# Patient Record
Sex: FEMALE | Race: WHITE | NOT HISPANIC OR LATINO
[De-identification: names, ages, dates, MRNs, and addresses within clinical notes are randomized per-mention and may not be internally consistent; named-entity substitution may affect disease eponyms.]

---

## 2017-02-04 ENCOUNTER — RX RENEWAL (OUTPATIENT)
Age: 37
End: 2017-02-04

## 2017-06-26 ENCOUNTER — MEDICATION RENEWAL (OUTPATIENT)
Age: 37
End: 2017-06-26

## 2017-07-07 ENCOUNTER — NON-APPOINTMENT (OUTPATIENT)
Age: 37
End: 2017-07-07

## 2017-07-07 ENCOUNTER — APPOINTMENT (OUTPATIENT)
Dept: INTERNAL MEDICINE | Facility: CLINIC | Age: 37
End: 2017-07-07

## 2017-07-07 VITALS
TEMPERATURE: 97.8 F | OXYGEN SATURATION: 99 % | BODY MASS INDEX: 31.02 KG/M2 | DIASTOLIC BLOOD PRESSURE: 60 MMHG | WEIGHT: 193 LBS | HEART RATE: 60 BPM | SYSTOLIC BLOOD PRESSURE: 90 MMHG | HEIGHT: 66 IN

## 2017-07-07 DIAGNOSIS — Z87.42 PERSONAL HISTORY OF OTHER DISEASES OF THE FEMALE GENITAL TRACT: ICD-10-CM

## 2017-07-07 LAB
25(OH)D3 SERPL-MCNC: 35.1 NG/ML
ALBUMIN SERPL ELPH-MCNC: 4.1 G/DL
ALP BLD-CCNC: 59 U/L
ALT SERPL-CCNC: 23 U/L
ANION GAP SERPL CALC-SCNC: 15 MMOL/L
AST SERPL-CCNC: 18 U/L
BASOPHILS # BLD AUTO: 0.02 K/UL
BASOPHILS NFR BLD AUTO: 0.3 %
BILIRUB SERPL-MCNC: 0.2 MG/DL
BILIRUB UR QL STRIP: NORMAL
BUN SERPL-MCNC: 20 MG/DL
CALCIUM SERPL-MCNC: 9.7 MG/DL
CHLORIDE SERPL-SCNC: 101 MMOL/L
CHOLEST SERPL-MCNC: 215 MG/DL
CHOLEST/HDLC SERPL: 3.5 RATIO
CO2 SERPL-SCNC: 24 MMOL/L
CREAT SERPL-MCNC: 0.92 MG/DL
EOSINOPHIL # BLD AUTO: 0.19 K/UL
EOSINOPHIL NFR BLD AUTO: 2.7 %
GLUCOSE SERPL-MCNC: 97 MG/DL
GLUCOSE UR-MCNC: NORMAL
HBA1C MFR BLD HPLC: 5.2 %
HCG UR QL: 0.2 EU/DL
HCT VFR BLD CALC: 43.8 %
HDLC SERPL-MCNC: 61 MG/DL
HGB BLD-MCNC: 14.3 G/DL
HGB UR QL STRIP.AUTO: NORMAL
IMM GRANULOCYTES NFR BLD AUTO: 0.7 %
KETONES UR-MCNC: NORMAL
LDLC SERPL CALC-MCNC: 136 MG/DL
LEUKOCYTE ESTERASE UR QL STRIP: NORMAL
LYMPHOCYTES # BLD AUTO: 2.15 K/UL
LYMPHOCYTES NFR BLD AUTO: 31.1 %
MAN DIFF?: NORMAL
MCHC RBC-ENTMCNC: 31.6 PG
MCHC RBC-ENTMCNC: 32.6 GM/DL
MCV RBC AUTO: 96.7 FL
MONOCYTES # BLD AUTO: 0.52 K/UL
MONOCYTES NFR BLD AUTO: 7.5 %
NEUTROPHILS # BLD AUTO: 3.99 K/UL
NEUTROPHILS NFR BLD AUTO: 57.7 %
NITRITE UR QL STRIP: NORMAL
PH UR STRIP: 6.5
PLATELET # BLD AUTO: 215 K/UL
POTASSIUM SERPL-SCNC: 5.2 MMOL/L
PROT SERPL-MCNC: 7.5 G/DL
PROT UR STRIP-MCNC: NORMAL
RBC # BLD: 4.53 M/UL
RBC # FLD: 14.2 %
SODIUM SERPL-SCNC: 140 MMOL/L
SP GR UR STRIP: 1.02
TRIGL SERPL-MCNC: 92 MG/DL
TSH SERPL-ACNC: 1.5 UIU/ML
WBC # FLD AUTO: 6.92 K/UL

## 2017-07-07 RX ORDER — SODIUM PICOSULFATE, MAGNESIUM OXIDE, AND ANHYDROUS CITRIC ACID 10; 3.5; 12 MG/16.2G; G/16.2G; G/16.2G
10-3.5-12 POWDER, METERED ORAL
Qty: 2 | Refills: 0 | Status: COMPLETED | COMMUNITY
Start: 2017-05-05

## 2017-07-09 LAB — 5NT SERPL-CCNC: 4 U/L

## 2017-10-10 ENCOUNTER — MEDICATION RENEWAL (OUTPATIENT)
Age: 37
End: 2017-10-10

## 2017-10-11 ENCOUNTER — RX RENEWAL (OUTPATIENT)
Age: 37
End: 2017-10-11

## 2017-12-06 ENCOUNTER — APPOINTMENT (OUTPATIENT)
Dept: INTERNAL MEDICINE | Facility: CLINIC | Age: 37
End: 2017-12-06
Payer: COMMERCIAL

## 2017-12-06 VITALS
DIASTOLIC BLOOD PRESSURE: 62 MMHG | BODY MASS INDEX: 30.86 KG/M2 | HEIGHT: 66 IN | HEART RATE: 80 BPM | TEMPERATURE: 97.7 F | WEIGHT: 192 LBS | SYSTOLIC BLOOD PRESSURE: 102 MMHG | RESPIRATION RATE: 16 BRPM | OXYGEN SATURATION: 98 %

## 2017-12-06 LAB
FLUAV SPEC QL CULT: NORMAL
FLUBV AG SPEC QL IA: NORMAL

## 2017-12-06 PROCEDURE — 87804 INFLUENZA ASSAY W/OPTIC: CPT | Mod: 59,QW

## 2017-12-06 PROCEDURE — 99213 OFFICE O/P EST LOW 20 MIN: CPT

## 2018-02-12 ENCOUNTER — APPOINTMENT (OUTPATIENT)
Dept: INTERNAL MEDICINE | Facility: CLINIC | Age: 38
End: 2018-02-12
Payer: COMMERCIAL

## 2018-02-12 VITALS
SYSTOLIC BLOOD PRESSURE: 100 MMHG | BODY MASS INDEX: 30.92 KG/M2 | HEART RATE: 75 BPM | TEMPERATURE: 98.2 F | DIASTOLIC BLOOD PRESSURE: 60 MMHG | OXYGEN SATURATION: 96 % | HEIGHT: 67 IN | WEIGHT: 197 LBS

## 2018-02-12 LAB
FLUAV SPEC QL CULT: NEGATIVE
FLUBV AG SPEC QL IA: NEGATIVE

## 2018-02-12 PROCEDURE — 99213 OFFICE O/P EST LOW 20 MIN: CPT

## 2018-02-12 PROCEDURE — 87804 INFLUENZA ASSAY W/OPTIC: CPT | Mod: 59,QW

## 2018-02-12 RX ORDER — OSELTAMIVIR PHOSPHATE 75 MG/1
75 CAPSULE ORAL TWICE DAILY
Qty: 10 | Refills: 0 | Status: DISCONTINUED | COMMUNITY
Start: 2017-12-06 | End: 2018-02-12

## 2018-02-13 LAB — RAPID RVP RESULT: NOT DETECTED

## 2018-04-24 ENCOUNTER — RX RENEWAL (OUTPATIENT)
Age: 38
End: 2018-04-24

## 2018-06-11 ENCOUNTER — APPOINTMENT (OUTPATIENT)
Dept: INTERNAL MEDICINE | Facility: CLINIC | Age: 38
End: 2018-06-11
Payer: COMMERCIAL

## 2018-06-11 VITALS
HEART RATE: 73 BPM | BODY MASS INDEX: 30.92 KG/M2 | DIASTOLIC BLOOD PRESSURE: 76 MMHG | TEMPERATURE: 97.7 F | RESPIRATION RATE: 16 BRPM | OXYGEN SATURATION: 99 % | WEIGHT: 197 LBS | SYSTOLIC BLOOD PRESSURE: 114 MMHG | HEIGHT: 67 IN

## 2018-06-11 PROCEDURE — 99213 OFFICE O/P EST LOW 20 MIN: CPT

## 2018-06-11 NOTE — HISTORY OF PRESENT ILLNESS
[FreeTextEntry8] : Pt here for a sick visit.\par \par She woke up this morning feeling very fatigued and lightheaded. She sensed onset of fatigue yesterday, was napping most of day. Felt sense of fogginess while driving here today. She felt dizzy while getting out of bed this morning especially with head movements. She may have had vertigo. No hearing loss or tinnitus. No fever or chills. She developed a dry cough yesterday but dissipated today. Her voice is hoarse. She feels weak and fatigued. No nasal congestion, sneezing, sore throat, or sinus pressure. She felt nauseous earlier but no vomiting. She had a headache yesterday and took Advil. 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 No recent travel.

## 2018-06-11 NOTE — REVIEW OF SYSTEMS
[Fever] : no fever [Chills] : no chills [Fatigue] : fatigue [Earache] : no earache [Hoarseness] : hoarseness [Nasal Discharge] : no nasal discharge [Sore Throat] : no sore throat [Chest Pain] : no chest pain [Lower Ext Edema] : no lower extremity edema [Shortness Of Breath] : no shortness of breath [Cough] : no cough [Abdominal Pain] : no abdominal pain [Nausea] : nausea [Diarrhea] : diarrhea [Vomiting] : no vomiting [Dysuria] : no dysuria [Frequency] : no frequency

## 2018-06-11 NOTE — ASSESSMENT
[FreeTextEntry1] : Patient appears to have BPPV with associated mild URI that is likely viral. She is afebrile and without myalgias so influenza is unlikely. She will be started on Meclizine PRN for dizziness. She was advised on adequate hydration. NSAID's PRN. She will go home and rest today. Will consider ENT evaluation and management if the dizziness persists during the week.

## 2018-06-11 NOTE — PHYSICAL EXAM
[No Acute Distress] : no acute distress [Well Nourished] : well nourished [Well Developed] : well developed [PERRL] : pupils equal round and reactive to light [EOMI] : extraocular movements intact [Normal Outer Ear/Nose] : the outer ears and nose were normal in appearance [Normal Oropharynx] : the oropharynx was normal [Normal TMs] : both tympanic membranes were normal [Supple] : supple [No Lymphadenopathy] : no lymphadenopathy [No Respiratory Distress] : no respiratory distress  [Clear to Auscultation] : lungs were clear to auscultation bilaterally [Normal Rate] : normal rate  [Regular Rhythm] : with a regular rhythm [Normal S1, S2] : normal S1 and S2 [No Murmur] : no murmur heard [No Edema] : there was no peripheral edema [Soft] : abdomen soft [Non Tender] : non-tender [Non-distended] : non-distended [Normal Gait] : normal gait [No Focal Deficits] : no focal deficits [de-identified] : no nystagmus [de-identified] : no sinus tenderness [de-identified] : rox cohn positive on right

## 2018-07-17 ENCOUNTER — LABORATORY RESULT (OUTPATIENT)
Age: 38
End: 2018-07-17

## 2018-07-17 ENCOUNTER — APPOINTMENT (OUTPATIENT)
Dept: INTERNAL MEDICINE | Facility: CLINIC | Age: 38
End: 2018-07-17
Payer: COMMERCIAL

## 2018-07-17 PROCEDURE — 36415 COLL VENOUS BLD VENIPUNCTURE: CPT

## 2018-07-20 ENCOUNTER — NON-APPOINTMENT (OUTPATIENT)
Age: 38
End: 2018-07-20

## 2018-07-20 ENCOUNTER — APPOINTMENT (OUTPATIENT)
Dept: INTERNAL MEDICINE | Facility: CLINIC | Age: 38
End: 2018-07-20
Payer: COMMERCIAL

## 2018-07-20 VITALS
OXYGEN SATURATION: 99 % | SYSTOLIC BLOOD PRESSURE: 120 MMHG | BODY MASS INDEX: 31.13 KG/M2 | WEIGHT: 196 LBS | HEIGHT: 66.5 IN | DIASTOLIC BLOOD PRESSURE: 72 MMHG | TEMPERATURE: 97.9 F | HEART RATE: 64 BPM

## 2018-07-20 DIAGNOSIS — R68.89 OTHER GENERAL SYMPTOMS AND SIGNS: ICD-10-CM

## 2018-07-20 DIAGNOSIS — L82.1 OTHER SEBORRHEIC KERATOSIS: ICD-10-CM

## 2018-07-20 DIAGNOSIS — Z82.49 FAMILY HISTORY OF ISCHEMIC HEART DISEASE AND OTHER DISEASES OF THE CIRCULATORY SYSTEM: ICD-10-CM

## 2018-07-20 DIAGNOSIS — H81.10 BENIGN PAROXYSMAL VERTIGO, UNSPECIFIED EAR: ICD-10-CM

## 2018-07-20 DIAGNOSIS — J06.9 ACUTE UPPER RESPIRATORY INFECTION, UNSPECIFIED: ICD-10-CM

## 2018-07-20 LAB
25(OH)D3 SERPL-MCNC: 26.1 NG/ML
ALBUMIN SERPL ELPH-MCNC: 4.1 G/DL
ALP BLD-CCNC: 55 U/L
ALT SERPL-CCNC: 21 U/L
ANION GAP SERPL CALC-SCNC: 14 MMOL/L
AST SERPL-CCNC: 27 U/L
BASOPHILS # BLD AUTO: 0.02 K/UL
BASOPHILS NFR BLD AUTO: 0.4 %
BILIRUB SERPL-MCNC: 0.4 MG/DL
BUN SERPL-MCNC: 13 MG/DL
CALCIUM SERPL-MCNC: 9.4 MG/DL
CHLORIDE SERPL-SCNC: 104 MMOL/L
CHOLEST SERPL-MCNC: 205 MG/DL
CHOLEST/HDLC SERPL: 4 RATIO
CO2 SERPL-SCNC: 22 MMOL/L
CREAT SERPL-MCNC: 0.69 MG/DL
EOSINOPHIL # BLD AUTO: 0.12 K/UL
EOSINOPHIL NFR BLD AUTO: 2.4 %
GLUCOSE SERPL-MCNC: 102 MG/DL
HBA1C MFR BLD HPLC: 5.4 %
HCT VFR BLD CALC: 40.5 %
HDLC SERPL-MCNC: 51 MG/DL
HGB BLD-MCNC: 13.6 G/DL
IMM GRANULOCYTES NFR BLD AUTO: 0.6 %
LDLC SERPL CALC-MCNC: 137 MG/DL
LYMPHOCYTES # BLD AUTO: 1.35 K/UL
LYMPHOCYTES NFR BLD AUTO: 27.4 %
MAN DIFF?: NORMAL
MCHC RBC-ENTMCNC: 31.4 PG
MCHC RBC-ENTMCNC: 33.6 GM/DL
MCV RBC AUTO: 93.5 FL
MONOCYTES # BLD AUTO: 0.44 K/UL
MONOCYTES NFR BLD AUTO: 8.9 %
NEUTROPHILS # BLD AUTO: 2.97 K/UL
NEUTROPHILS NFR BLD AUTO: 60.3 %
PLATELET # BLD AUTO: 216 K/UL
POTASSIUM SERPL-SCNC: 4 MMOL/L
PROT SERPL-MCNC: 7.4 G/DL
RBC # BLD: 4.33 M/UL
RBC # FLD: 14.2 %
SODIUM SERPL-SCNC: 140 MMOL/L
T3RU NFR SERPL: 1.02 INDEX
T4 SERPL-MCNC: 8.1 UG/DL
TRIGL SERPL-MCNC: 85 MG/DL
TSH SERPL-ACNC: 0.98 UIU/ML
WBC # FLD AUTO: 4.93 K/UL

## 2018-07-20 PROCEDURE — 99406 BEHAV CHNG SMOKING 3-10 MIN: CPT

## 2018-07-20 PROCEDURE — 93000 ELECTROCARDIOGRAM COMPLETE: CPT

## 2018-07-20 PROCEDURE — 99395 PREV VISIT EST AGE 18-39: CPT | Mod: 25

## 2018-07-20 RX ORDER — MECLIZINE HYDROCHLORIDE 12.5 MG/1
12.5 TABLET ORAL 3 TIMES DAILY
Qty: 30 | Refills: 1 | Status: DISCONTINUED | COMMUNITY
Start: 2018-06-11 | End: 2018-07-20

## 2018-07-21 PROBLEM — L82.1 SEBORRHEIC KERATOSES: Status: ACTIVE | Noted: 2018-07-21

## 2018-07-21 PROBLEM — H81.10 BENIGN PAROXYSMAL POSITIONAL VERTIGO: Status: RESOLVED | Noted: 2018-06-11 | Resolved: 2018-07-21

## 2018-07-21 PROBLEM — J06.9 ACUTE URI: Status: RESOLVED | Noted: 2018-02-12 | Resolved: 2018-07-21

## 2018-07-21 PROBLEM — R68.89 FLU-LIKE SYMPTOMS: Status: RESOLVED | Noted: 2017-12-06 | Resolved: 2018-07-21

## 2018-07-21 LAB
APPEARANCE: CLEAR
BACTERIA: NEGATIVE
BILIRUBIN URINE: NEGATIVE
BLOOD URINE: ABNORMAL
COLOR: YELLOW
GLUCOSE QUALITATIVE U: NEGATIVE MG/DL
HYALINE CASTS: 0 /LPF
KETONES URINE: NEGATIVE
LEUKOCYTE ESTERASE URINE: NEGATIVE
MICROSCOPIC-UA: NORMAL
NITRITE URINE: NEGATIVE
PH URINE: 5.5
PROTEIN URINE: ABNORMAL MG/DL
RED BLOOD CELLS URINE: 12 /HPF
SPECIFIC GRAVITY URINE: 1.02
SQUAMOUS EPITHELIAL CELLS: 13 /HPF
UROBILINOGEN URINE: NEGATIVE MG/DL
WHITE BLOOD CELLS URINE: 2 /HPF

## 2018-07-21 NOTE — HEALTH RISK ASSESSMENT
[No falls in past year] : Patient reported no falls in the past year [0] : 2) Feeling down, depressed, or hopeless: Not at all (0) [Patient reported PAP Smear was normal] : Patient reported PAP Smear was normal [HIV Test offered] : HIV Test offered [None] : None [With Family] : lives with family [Single] : single [Sexually Active] : sexually active [Feels Safe at Home] : Feels safe at home [Fully functional (bathing, dressing, toileting, transferring, walking, feeding)] : Fully functional (bathing, dressing, toileting, transferring, walking, feeding) [Fully functional (using the telephone, shopping, preparing meals, housekeeping, doing laundry, using] : Fully functional and needs no help or supervision to perform IADLs (using the telephone, shopping, preparing meals, housekeeping, doing laundry, using transportation, managing medications and managing finances) [Smoke Detector] : smoke detector [Carbon Monoxide Detector] : carbon monoxide detector [Guns at Home] : guns at home [Seat Belt] :  uses seat belt [Sunscreen] : uses sunscreen [] : No [de-identified] : social [[id number]] : 0 [Change in mental status noted] : No change in mental status noted [Language] : denies difficulty with language [Behavior] : denies difficulty with behavior [Learning/Retaining New Information] : denies difficulty learning/retaining new information [Handling Complex Tasks] : denies difficulty handling complex tasks [Reasoning] : denies difficulty with reasoning [Spatial Ability and Orientation] : denies difficulty with spatial ability and orientation [High Risk Behavior] : no high risk behavior [Reports changes in hearing] : Reports no changes in hearing [Reports changes in vision] : Reports no changes in vision [Reports changes in dental health] : Reports no changes in dental health [Travel to Developing Areas] : does not  travel to developing areas [PapSmearDate] : 2018 [ColonoscopyDate] : 5/2017 [ColonoscopyComments] : no polyps [de-identified] : stored safely

## 2018-07-21 NOTE — REVIEW OF SYSTEMS
[Night Sweats] : night sweats [Palpitations] : palpitations [Suicidal] : suicidal [Anxiety] : anxiety [Depression] : depression [Negative] : Heme/Lymph

## 2018-07-21 NOTE — ASSESSMENT
[FreeTextEntry1] : The patient was given a referral to psychiatry for her depression and episodic suicidal ideation.  She states she would not actively hurt herself.  Emotional support given. \par She was given a referral to plastic surgery to consider breast reduction\par Her blood work was reviewed with her.  She will begin Vitamin D  400- 1000 units daily\par Smoking cessation reviewed..methods discussed.  Patient aware of deleterious effects of smoking which were reviewed with her.  She is not ready to consider quitting.  3 minutes spent in discussion\par She was reassured about benign nature of seborrheic keratoses.\par She is asymptomatic from her sinus bradycardia

## 2018-07-21 NOTE — HISTORY OF PRESENT ILLNESS
[FreeTextEntry1] : CPE [de-identified] : Patient is concerned about a skin lesion on her left lateral leg and right flank. She is concerned about one on her right flank as it is darker. She is also considering breast reduction as she is getting neck pain and she has ridges on her shoulders from the bra straps. She has pain with running and has to hold her breasts when running despite a sports bar. She runs regularly 4 times a week without chest pain or shortness of breath. She had vertigo in June which is resolved.   She has no hearing issues no tinnitus.  Her vision is fine. For the past month she can wake up with sweats.   She's had no fever. Her menses are regular.   She sees a gynecologist and she has her menses  now. She wells up in tears talking about some regrets and depression and some suicidal ideation  which occurs mainly before her menses. She feels life has passed her by  she is not  and doesn't have kids. She feels the years she devoted to caring for her father caused this but she does not regret that . She says that people  state "god" will pay her back but she hasn't been paid back. She eats a healthy diet.   She is living with her mother and brother  and they both smoke as does she. She smokes half a pack a day. She is not ready to consider smoking cessation\par Rarely she can feel a skipped heartbeat when she is just sitting.  She has no dizziness. \par Her bowels and urine are fine.\par Her panic attacks have been rare.\par She tries to eat a healthy diet.

## 2018-07-21 NOTE — HEALTH RISK ASSESSMENT
[No falls in past year] : Patient reported no falls in the past year [0] : 2) Feeling down, depressed, or hopeless: Not at all (0) [Patient reported PAP Smear was normal] : Patient reported PAP Smear was normal [HIV Test offered] : HIV Test offered [None] : None [With Family] : lives with family [Single] : single [Sexually Active] : sexually active [Feels Safe at Home] : Feels safe at home [Fully functional (bathing, dressing, toileting, transferring, walking, feeding)] : Fully functional (bathing, dressing, toileting, transferring, walking, feeding) [Fully functional (using the telephone, shopping, preparing meals, housekeeping, doing laundry, using] : Fully functional and needs no help or supervision to perform IADLs (using the telephone, shopping, preparing meals, housekeeping, doing laundry, using transportation, managing medications and managing finances) [Smoke Detector] : smoke detector [Carbon Monoxide Detector] : carbon monoxide detector [Guns at Home] : guns at home [Seat Belt] :  uses seat belt [Sunscreen] : uses sunscreen [] : No [de-identified] : social [[id number]] : 0 [Change in mental status noted] : No change in mental status noted [Language] : denies difficulty with language [Behavior] : denies difficulty with behavior [Learning/Retaining New Information] : denies difficulty learning/retaining new information [Handling Complex Tasks] : denies difficulty handling complex tasks [Reasoning] : denies difficulty with reasoning [Spatial Ability and Orientation] : denies difficulty with spatial ability and orientation [High Risk Behavior] : no high risk behavior [Reports changes in hearing] : Reports no changes in hearing [Reports changes in vision] : Reports no changes in vision [Reports changes in dental health] : Reports no changes in dental health [Travel to Developing Areas] : does not  travel to developing areas [PapSmearDate] : 2018 [ColonoscopyDate] : 5/2017 [ColonoscopyComments] : no polyps [de-identified] : stored safely

## 2018-07-21 NOTE — PHYSICAL EXAM
[No Acute Distress] : no acute distress [Well Nourished] : well nourished [Well Developed] : well developed [Well-Appearing] : well-appearing [Normal Voice/Communication] : normal voice/communication [Normal Sclera/Conjunctiva] : normal sclera/conjunctiva [PERRL] : pupils equal round and reactive to light [EOMI] : extraocular movements intact [Normal Outer Ear/Nose] : the outer ears and nose were normal in appearance [Normal Oropharynx] : the oropharynx was normal [Normal TMs] : both tympanic membranes were normal [No JVD] : no jugular venous distention [Supple] : supple [No Lymphadenopathy] : no lymphadenopathy [Thyroid Normal, No Nodules] : the thyroid was normal and there were no nodules present [No Respiratory Distress] : no respiratory distress  [Clear to Auscultation] : lungs were clear to auscultation bilaterally [No Accessory Muscle Use] : no accessory muscle use [Normal Percussion] : the chest was normal to percussion [Normal Rate] : normal rate  [Regular Rhythm] : with a regular rhythm [Normal S1, S2] : normal S1 and S2 [No Murmur] : no murmur heard [No Carotid Bruits] : no carotid bruits [No Abdominal Bruit] : a ~M bruit was not heard ~T in the abdomen [No Varicosities] : no varicosities [Pedal Pulses Present] : the pedal pulses are present [No Edema] : there was no peripheral edema [No Extremity Clubbing/Cyanosis] : no extremity clubbing/cyanosis [No Palpable Aorta] : no palpable aorta [No Nipple Discharge] : no nipple discharge [No Axillary Lymphadenopathy] : no axillary lymphadenopathy [Soft] : abdomen soft [Non Tender] : non-tender [Non-distended] : non-distended [No Masses] : no abdominal mass palpated [No HSM] : no HSM [Normal Bowel Sounds] : normal bowel sounds [Normal Supraclavicular Nodes] : no supraclavicular lymphadenopathy [Normal Axillary Nodes] : no axillary lymphadenopathy [Normal Posterior Cervical Nodes] : no posterior cervical lymphadenopathy [Normal Anterior Cervical Nodes] : no anterior cervical lymphadenopathy [Normal Inguinal Nodes] : no inguinal lymphadenopathy [No CVA Tenderness] : no CVA  tenderness [No Spinal Tenderness] : no spinal tenderness [No Joint Swelling] : no joint swelling [Grossly Normal Strength/Tone] : grossly normal strength/tone [No Rash] : no rash [Normal Gait] : normal gait [Coordination Grossly Intact] : coordination grossly intact [No Focal Deficits] : no focal deficits [Deep Tendon Reflexes (DTR)] : deep tendon reflexes were 2+ and symmetric [Speech Grossly Normal] : speech grossly normal [Memory Grossly Normal] : memory grossly normal [Normal Affect] : the affect was normal [Alert and Oriented x3] : oriented to person, place, and time [Normal Mood] : the mood was normal [Normal Insight/Judgement] : insight and judgment were intact [Kyphosis] : no kyphosis [Scoliosis] : no scoliosis [de-identified] : pendulous [de-identified] : Seborrheic keratoses left lateral thigh and right flank

## 2018-07-21 NOTE — HISTORY OF PRESENT ILLNESS
[FreeTextEntry1] : CPE [de-identified] : Patient is concerned about a skin lesion on her left lateral leg and right flank. She is concerned about one on her right flank as it is darker. She is also considering breast reduction as she is getting neck pain and she has ridges on her shoulders from the bra straps. She has pain with running and has to hold her breasts when running despite a sports bar. She runs regularly 4 times a week without chest pain or shortness of breath. She had vertigo in June which is resolved.   She has no hearing issues no tinnitus.  Her vision is fine. For the past month she can wake up with sweats.   She's had no fever. Her menses are regular.   She sees a gynecologist and she has her menses  now. She wells up in tears talking about some regrets and depression and some suicidal ideation  which occurs mainly before her menses. She feels life has passed her by  she is not  and doesn't have kids. She feels the years she devoted to caring for her father caused this but she does not regret that . She says that people  state "god" will pay her back but she hasn't been paid back. She eats a healthy diet.   She is living with her mother and brother  and they both smoke as does she. She smokes half a pack a day. She is not ready to consider smoking cessation\par Rarely she can feel a skipped heartbeat when she is just sitting.  She has no dizziness. \par Her bowels and urine are fine.\par Her panic attacks have been rare.\par She tries to eat a healthy diet.

## 2018-07-24 ENCOUNTER — RX RENEWAL (OUTPATIENT)
Age: 38
End: 2018-07-24

## 2018-07-24 ENCOUNTER — MEDICATION RENEWAL (OUTPATIENT)
Age: 38
End: 2018-07-24

## 2018-10-03 ENCOUNTER — RX RENEWAL (OUTPATIENT)
Age: 38
End: 2018-10-03

## 2018-10-03 ENCOUNTER — MEDICATION RENEWAL (OUTPATIENT)
Age: 38
End: 2018-10-03

## 2018-12-14 ENCOUNTER — APPOINTMENT (OUTPATIENT)
Dept: INTERNAL MEDICINE | Facility: CLINIC | Age: 38
End: 2018-12-14
Payer: COMMERCIAL

## 2018-12-14 VITALS — TEMPERATURE: 97.8 F

## 2018-12-14 PROCEDURE — 87804 INFLUENZA ASSAY W/OPTIC: CPT | Mod: 59,QW

## 2018-12-14 PROCEDURE — 99213 OFFICE O/P EST LOW 20 MIN: CPT

## 2018-12-14 NOTE — PHYSICAL EXAM
[No Acute Distress] : no acute distress [Normal Voice/Communication] : normal voice/communication [Normal Gait] : normal gait [Normal Mood] : the mood was normal [Normal Insight/Judgement] : insight and judgment were intact [de-identified] : mild fatigue

## 2018-12-14 NOTE — REVIEW OF SYSTEMS
[Chills] : chills [Fatigue] : fatigue [Negative] : Heme/Lymph [Fever] : no fever [Night Sweats] : no night sweats [Earache] : no earache [Nasal Discharge] : no nasal discharge [Sore Throat] : no sore throat [Postnasal Drip] : no postnasal drip [Chest Pain] : no chest pain [Shortness Of Breath] : no shortness of breath [Cough] : no cough [Vomiting] : no vomiting

## 2018-12-14 NOTE — ASSESSMENT
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

## 2018-12-14 NOTE — HISTORY OF PRESENT ILLNESS
[FreeTextEntry8] : presents for eval of muscle aches, chills, very mild fatigue for 24 hours. no cough or significant congestion, no recorded fevers. took tylenol prn. muscle aches worsened today and she felt feverish. she has declined to have influenza vaccine.

## 2018-12-18 LAB
FLUAV SPEC QL CULT: POSITIVE
FLUBV AG SPEC QL IA: NEGATIVE

## 2019-01-05 ENCOUNTER — CLINICAL ADVICE (OUTPATIENT)
Age: 39
End: 2019-01-05

## 2019-01-07 ENCOUNTER — APPOINTMENT (OUTPATIENT)
Dept: INTERNAL MEDICINE | Facility: CLINIC | Age: 39
End: 2019-01-07
Payer: COMMERCIAL

## 2019-01-07 ENCOUNTER — LABORATORY RESULT (OUTPATIENT)
Age: 39
End: 2019-01-07

## 2019-01-07 VITALS
OXYGEN SATURATION: 100 % | TEMPERATURE: 97.8 F | BODY MASS INDEX: 30.34 KG/M2 | HEART RATE: 71 BPM | DIASTOLIC BLOOD PRESSURE: 64 MMHG | HEIGHT: 66.5 IN | SYSTOLIC BLOOD PRESSURE: 106 MMHG | WEIGHT: 191 LBS

## 2019-01-07 LAB
ALBUMIN SERPL ELPH-MCNC: 4.5 G/DL
ALP BLD-CCNC: 62 U/L
ALT SERPL-CCNC: 16 U/L
ANION GAP SERPL CALC-SCNC: 12 MMOL/L
AST SERPL-CCNC: 16 U/L
BASOPHILS # BLD AUTO: 0.02 K/UL
BASOPHILS NFR BLD AUTO: 0.3 %
BILIRUB SERPL-MCNC: 0.2 MG/DL
BUN SERPL-MCNC: 13 MG/DL
CALCIUM SERPL-MCNC: 9.4 MG/DL
CHLORIDE SERPL-SCNC: 105 MMOL/L
CO2 SERPL-SCNC: 21 MMOL/L
CREAT SERPL-MCNC: 0.85 MG/DL
EOSINOPHIL # BLD AUTO: 0.1 K/UL
EOSINOPHIL NFR BLD AUTO: 1.4 %
GLUCOSE SERPL-MCNC: 91 MG/DL
HCT VFR BLD CALC: 46 %
HGB BLD-MCNC: 15 G/DL
IMM GRANULOCYTES NFR BLD AUTO: 0.4 %
LYMPHOCYTES # BLD AUTO: 1.4 K/UL
LYMPHOCYTES NFR BLD AUTO: 19 %
MAN DIFF?: NORMAL
MCHC RBC-ENTMCNC: 31.1 PG
MCHC RBC-ENTMCNC: 32.6 GM/DL
MCV RBC AUTO: 95.2 FL
MONOCYTES # BLD AUTO: 0.38 K/UL
MONOCYTES NFR BLD AUTO: 5.2 %
NEUTROPHILS # BLD AUTO: 5.42 K/UL
NEUTROPHILS NFR BLD AUTO: 73.7 %
PLATELET # BLD AUTO: 243 K/UL
POTASSIUM SERPL-SCNC: 4.3 MMOL/L
PROT SERPL-MCNC: 8 G/DL
RBC # BLD: 4.83 M/UL
RBC # FLD: 13.8 %
SODIUM SERPL-SCNC: 138 MMOL/L
T3RU NFR SERPL: 1.09 INDEX
T4 SERPL-MCNC: 8.8 UG/DL
TSH SERPL-ACNC: 1.11 UIU/ML
WBC # FLD AUTO: 7.35 K/UL

## 2019-01-07 PROCEDURE — G0444 DEPRESSION SCREEN ANNUAL: CPT

## 2019-01-07 PROCEDURE — 99214 OFFICE O/P EST MOD 30 MIN: CPT | Mod: 25

## 2019-01-07 PROCEDURE — 36415 COLL VENOUS BLD VENIPUNCTURE: CPT

## 2019-01-07 RX ORDER — OSELTAMIVIR PHOSPHATE 75 MG/1
75 CAPSULE ORAL TWICE DAILY
Qty: 10 | Refills: 0 | Status: DISCONTINUED | COMMUNITY
Start: 2018-12-14 | End: 2019-01-07

## 2019-01-07 NOTE — HISTORY OF PRESENT ILLNESS
[FreeTextEntry8] : Patient is being seen acutely due to depression. She states that this  started before [place] but has been worse this weekend.  . She has become more depressed crying a lot and  does want to get out of bed. She feels she is still in the same place and has no purpose . 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 She feels she is still having some flashbacks with her grandmother who  in a house several months ago. She thinks a lot about not wanting to live but denies she would do anything to harm herself. She is not having panic attacks.  She is taking currently one clonazepam a week . Her sleep is sometimes interrupted. Her appetite is okay.  She is exercising running on the treadmill. Her menses are regular the last one about 3 weeks ago. She does not feel she is having premenstrual symptoms.  she cannot afford to live on her own.  She has a good relationship with her mother and brother but she feels they cannot understand her situation.

## 2019-01-07 NOTE — ASSESSMENT
[FreeTextEntry1] : 38-year-old female with hx  panic attacks who is now quite depressed and may be having post traumatic stress disorder. Emotional support was given. She will begin Lexapro. We discussed going to a psychiatrist and referral was given  Blood work for CBC thyroid comprehensive metabolic panel was sent

## 2019-03-05 ENCOUNTER — RESULT REVIEW (OUTPATIENT)
Age: 39
End: 2019-03-05

## 2019-03-08 ENCOUNTER — RX RENEWAL (OUTPATIENT)
Age: 39
End: 2019-03-08

## 2019-03-08 ENCOUNTER — MEDICATION RENEWAL (OUTPATIENT)
Age: 39
End: 2019-03-08

## 2019-04-02 ENCOUNTER — APPOINTMENT (OUTPATIENT)
Dept: INTERNAL MEDICINE | Facility: CLINIC | Age: 39
End: 2019-04-02

## 2019-04-02 ENCOUNTER — APPOINTMENT (OUTPATIENT)
Age: 39
End: 2019-04-02
Payer: COMMERCIAL

## 2019-04-02 VITALS
BODY MASS INDEX: 30.34 KG/M2 | TEMPERATURE: 97.6 F | OXYGEN SATURATION: 99 % | HEIGHT: 66.5 IN | WEIGHT: 191 LBS | HEART RATE: 67 BPM

## 2019-04-02 LAB
FLUAV SPEC QL CULT: NORMAL
FLUBV AG SPEC QL IA: NORMAL

## 2019-04-02 PROCEDURE — 87804 INFLUENZA ASSAY W/OPTIC: CPT | Mod: QW

## 2019-04-02 PROCEDURE — 99213 OFFICE O/P EST LOW 20 MIN: CPT | Mod: 25

## 2019-04-02 NOTE — ASSESSMENT
[FreeTextEntry1] : discussed w pt \par vitals normal currently, afebrile \par POC influenza test neg, but may be falsely neg as symptoms started very recently\par advised fluids, tylenol for now \par monitor progress, consider checking RVP for confirmation , likely other viral etiology \par \par reevaluate if any worsening symptoms

## 2019-04-02 NOTE — PHYSICAL EXAM
[No Acute Distress] : no acute distress [Normal Voice/Communication] : normal voice/communication [Normal Sclera/Conjunctiva] : normal sclera/conjunctiva [Normal Outer Ear/Nose] : the outer ears and nose were normal in appearance [Normal Oropharynx] : the oropharynx was normal [Normal TMs] : both tympanic membranes were normal [No Lymphadenopathy] : no lymphadenopathy [No Respiratory Distress] : no respiratory distress  [Clear to Auscultation] : lungs were clear to auscultation bilaterally [No Accessory Muscle Use] : no accessory muscle use [Normal Supraclavicular Nodes] : no supraclavicular lymphadenopathy [Normal Posterior Cervical Nodes] : no posterior cervical lymphadenopathy [Normal Anterior Cervical Nodes] : no anterior cervical lymphadenopathy [Normal Gait] : normal gait [Normal Affect] : the affect was normal [Normal Mood] : the mood was normal [Normal Insight/Judgement] : insight and judgment were intact

## 2019-04-02 NOTE — HISTORY OF PRESENT ILLNESS
[FreeTextEntry8] : presents for eval of 12 hours of feeling nauseated w chills, mild throat pain , some fatigue. no fever, no muscle aches or cough. had influenza earlier this season. did not take any otc rx

## 2019-04-02 NOTE — REVIEW OF SYSTEMS
[Chills] : chills [Fatigue] : fatigue [Sore Throat] : sore throat [Negative] : Heme/Lymph [Fever] : no fever [Night Sweats] : no night sweats [Discharge] : no discharge [Earache] : no earache [Hoarseness] : no hoarseness [Nasal Discharge] : no nasal discharge [Postnasal Drip] : no postnasal drip [Chest Pain] : no chest pain [Shortness Of Breath] : no shortness of breath [Wheezing] : no wheezing [Cough] : no cough [Vomiting] : no vomiting

## 2019-05-08 ENCOUNTER — RX RENEWAL (OUTPATIENT)
Age: 39
End: 2019-05-08

## 2019-05-08 ENCOUNTER — MEDICATION RENEWAL (OUTPATIENT)
Age: 39
End: 2019-05-08

## 2019-06-03 ENCOUNTER — MEDICATION RENEWAL (OUTPATIENT)
Age: 39
End: 2019-06-03

## 2019-06-28 ENCOUNTER — OUTPATIENT (OUTPATIENT)
Dept: OUTPATIENT SERVICES | Facility: HOSPITAL | Age: 39
LOS: 1 days | End: 2019-06-28
Payer: COMMERCIAL

## 2019-06-28 ENCOUNTER — APPOINTMENT (OUTPATIENT)
Dept: MAMMOGRAPHY | Facility: CLINIC | Age: 39
End: 2019-06-28
Payer: COMMERCIAL

## 2019-06-28 DIAGNOSIS — Z00.8 ENCOUNTER FOR OTHER GENERAL EXAMINATION: ICD-10-CM

## 2019-06-28 DIAGNOSIS — Z98.89 OTHER SPECIFIED POSTPROCEDURAL STATES: Chronic | ICD-10-CM

## 2019-06-28 PROCEDURE — 77067 SCR MAMMO BI INCL CAD: CPT | Mod: 26

## 2019-06-28 PROCEDURE — 77063 BREAST TOMOSYNTHESIS BI: CPT | Mod: 26

## 2019-06-28 PROCEDURE — 77067 SCR MAMMO BI INCL CAD: CPT

## 2019-06-28 PROCEDURE — 77063 BREAST TOMOSYNTHESIS BI: CPT

## 2019-07-02 ENCOUNTER — OUTPATIENT (OUTPATIENT)
Dept: OUTPATIENT SERVICES | Facility: HOSPITAL | Age: 39
LOS: 1 days | Discharge: HOME | End: 2019-07-02
Payer: COMMERCIAL

## 2019-07-02 VITALS
RESPIRATION RATE: 16 BRPM | SYSTOLIC BLOOD PRESSURE: 104 MMHG | HEIGHT: 66 IN | TEMPERATURE: 98 F | HEART RATE: 58 BPM | DIASTOLIC BLOOD PRESSURE: 67 MMHG | WEIGHT: 199.08 LBS

## 2019-07-02 DIAGNOSIS — M53.85 OTHER SPECIFIED DORSOPATHIES, THORACOLUMBAR REGION: ICD-10-CM

## 2019-07-02 DIAGNOSIS — Z98.89 OTHER SPECIFIED POSTPROCEDURAL STATES: Chronic | ICD-10-CM

## 2019-07-02 DIAGNOSIS — N62 HYPERTROPHY OF BREAST: ICD-10-CM

## 2019-07-02 DIAGNOSIS — M53.82 OTHER SPECIFIED DORSOPATHIES, CERVICAL REGION: ICD-10-CM

## 2019-07-02 LAB
ANION GAP SERPL CALC-SCNC: 6 MMOL/L — SIGNIFICANT CHANGE UP (ref 5–17)
APPEARANCE UR: CLEAR — SIGNIFICANT CHANGE UP
BASOPHILS # BLD AUTO: 0.03 K/UL — SIGNIFICANT CHANGE UP (ref 0–0.2)
BASOPHILS NFR BLD AUTO: 0.5 % — SIGNIFICANT CHANGE UP (ref 0–2)
BILIRUB UR-MCNC: NEGATIVE — SIGNIFICANT CHANGE UP
BUN SERPL-MCNC: 11 MG/DL — SIGNIFICANT CHANGE UP (ref 7–23)
CALCIUM SERPL-MCNC: 8.8 MG/DL — SIGNIFICANT CHANGE UP (ref 8.5–10.1)
CHLORIDE SERPL-SCNC: 106 MMOL/L — SIGNIFICANT CHANGE UP (ref 96–108)
CO2 SERPL-SCNC: 27 MMOL/L — SIGNIFICANT CHANGE UP (ref 22–31)
COLOR SPEC: YELLOW — SIGNIFICANT CHANGE UP
CREAT SERPL-MCNC: 0.83 MG/DL — SIGNIFICANT CHANGE UP (ref 0.5–1.3)
DIFF PNL FLD: NEGATIVE — SIGNIFICANT CHANGE UP
EOSINOPHIL # BLD AUTO: 0.11 K/UL — SIGNIFICANT CHANGE UP (ref 0–0.5)
EOSINOPHIL NFR BLD AUTO: 1.8 % — SIGNIFICANT CHANGE UP (ref 0–6)
GLUCOSE SERPL-MCNC: 90 MG/DL — SIGNIFICANT CHANGE UP (ref 70–99)
GLUCOSE UR QL: NEGATIVE MG/DL — SIGNIFICANT CHANGE UP
HCT VFR BLD CALC: 42.4 % — SIGNIFICANT CHANGE UP (ref 34.5–45)
HGB BLD-MCNC: 14 G/DL — SIGNIFICANT CHANGE UP (ref 11.5–15.5)
IMM GRANULOCYTES NFR BLD AUTO: 0.7 % — SIGNIFICANT CHANGE UP (ref 0–1.5)
KETONES UR-MCNC: NEGATIVE — SIGNIFICANT CHANGE UP
LEUKOCYTE ESTERASE UR-ACNC: NEGATIVE — SIGNIFICANT CHANGE UP
LYMPHOCYTES # BLD AUTO: 1.87 K/UL — SIGNIFICANT CHANGE UP (ref 1–3.3)
LYMPHOCYTES # BLD AUTO: 31.4 % — SIGNIFICANT CHANGE UP (ref 13–44)
MCHC RBC-ENTMCNC: 31.5 PG — SIGNIFICANT CHANGE UP (ref 27–34)
MCHC RBC-ENTMCNC: 33 GM/DL — SIGNIFICANT CHANGE UP (ref 32–36)
MCV RBC AUTO: 95.5 FL — SIGNIFICANT CHANGE UP (ref 80–100)
MONOCYTES # BLD AUTO: 0.52 K/UL — SIGNIFICANT CHANGE UP (ref 0–0.9)
MONOCYTES NFR BLD AUTO: 8.7 % — SIGNIFICANT CHANGE UP (ref 2–14)
NEUTROPHILS # BLD AUTO: 3.39 K/UL — SIGNIFICANT CHANGE UP (ref 1.8–7.4)
NEUTROPHILS NFR BLD AUTO: 56.9 % — SIGNIFICANT CHANGE UP (ref 43–77)
NITRITE UR-MCNC: NEGATIVE — SIGNIFICANT CHANGE UP
PH UR: 5 — SIGNIFICANT CHANGE UP (ref 5–8)
PLATELET # BLD AUTO: 226 K/UL — SIGNIFICANT CHANGE UP (ref 150–400)
POTASSIUM SERPL-MCNC: 4.4 MMOL/L — SIGNIFICANT CHANGE UP (ref 3.5–5.3)
POTASSIUM SERPL-SCNC: 4.4 MMOL/L — SIGNIFICANT CHANGE UP (ref 3.5–5.3)
PROT UR-MCNC: NEGATIVE MG/DL — SIGNIFICANT CHANGE UP
RBC # BLD: 4.44 M/UL — SIGNIFICANT CHANGE UP (ref 3.8–5.2)
RBC # FLD: 13.2 % — SIGNIFICANT CHANGE UP (ref 10.3–14.5)
SODIUM SERPL-SCNC: 139 MMOL/L — SIGNIFICANT CHANGE UP (ref 135–145)
SP GR SPEC: 1.01 — SIGNIFICANT CHANGE UP (ref 1.01–1.02)
UROBILINOGEN FLD QL: NEGATIVE MG/DL — SIGNIFICANT CHANGE UP
WBC # BLD: 5.96 K/UL — SIGNIFICANT CHANGE UP (ref 3.8–10.5)
WBC # FLD AUTO: 5.96 K/UL — SIGNIFICANT CHANGE UP (ref 3.8–10.5)

## 2019-07-02 PROCEDURE — 93010 ELECTROCARDIOGRAM REPORT: CPT

## 2019-07-02 NOTE — H&P PST ADULT - HISTORY OF PRESENT ILLNESS
38 y/o female presents to PST for scheduled breast reduction on 7/12/19. Patient reports pain to both shoulders and and occasional neck pain x several years.

## 2019-07-02 NOTE — H&P PST ADULT - NSICDXPROBLEM_GEN_ALL_CORE_FT
PROBLEM DIAGNOSES  Problem: Breast hypertrophy  Assessment and Plan: Pre op and 3 day of Hibiclens instructions reviewed and given.   Patient verbalized understanding ,teach back method done   Take routine am meds DOS with sip of water.   Avoid NSAIDs and OTC supplements.

## 2019-07-11 RX ORDER — SODIUM CHLORIDE 9 MG/ML
3 INJECTION INTRAMUSCULAR; INTRAVENOUS; SUBCUTANEOUS EVERY 8 HOURS
Refills: 0 | Status: DISCONTINUED | OUTPATIENT
Start: 2019-07-12 | End: 2019-07-27

## 2019-07-12 ENCOUNTER — RESULT REVIEW (OUTPATIENT)
Age: 39
End: 2019-07-12

## 2019-07-12 ENCOUNTER — OUTPATIENT (OUTPATIENT)
Dept: OUTPATIENT SERVICES | Facility: HOSPITAL | Age: 39
LOS: 1 days | Discharge: HOME | End: 2019-07-12
Payer: COMMERCIAL

## 2019-07-12 VITALS
HEIGHT: 66 IN | TEMPERATURE: 98 F | RESPIRATION RATE: 16 BRPM | SYSTOLIC BLOOD PRESSURE: 92 MMHG | WEIGHT: 199.08 LBS | HEART RATE: 48 BPM | OXYGEN SATURATION: 99 % | DIASTOLIC BLOOD PRESSURE: 53 MMHG

## 2019-07-12 VITALS
TEMPERATURE: 98 F | HEART RATE: 62 BPM | SYSTOLIC BLOOD PRESSURE: 111 MMHG | RESPIRATION RATE: 14 BRPM | OXYGEN SATURATION: 100 % | DIASTOLIC BLOOD PRESSURE: 62 MMHG

## 2019-07-12 DIAGNOSIS — Z98.89 OTHER SPECIFIED POSTPROCEDURAL STATES: Chronic | ICD-10-CM

## 2019-07-12 LAB
ABO RH CONFIRMATION: SIGNIFICANT CHANGE UP
ALLERGY+IMMUNOLOGY DIAG STUDY NOTE: SIGNIFICANT CHANGE UP
HCG UR QL: NEGATIVE — SIGNIFICANT CHANGE UP

## 2019-07-12 PROCEDURE — 88305 TISSUE EXAM BY PATHOLOGIST: CPT | Mod: 26

## 2019-07-12 RX ORDER — APREPITANT 80 MG/1
40 CAPSULE ORAL ONCE
Refills: 0 | Status: COMPLETED | OUTPATIENT
Start: 2019-07-12 | End: 2019-07-12

## 2019-07-12 RX ORDER — SODIUM CHLORIDE 9 MG/ML
1000 INJECTION, SOLUTION INTRAVENOUS
Refills: 0 | Status: DISCONTINUED | OUTPATIENT
Start: 2019-07-12 | End: 2019-07-12

## 2019-07-12 RX ORDER — OXYCODONE HYDROCHLORIDE 5 MG/1
10 TABLET ORAL ONCE
Refills: 0 | Status: DISCONTINUED | OUTPATIENT
Start: 2019-07-12 | End: 2019-07-12

## 2019-07-12 RX ORDER — ACETAMINOPHEN 500 MG
975 TABLET ORAL ONCE
Refills: 0 | Status: COMPLETED | OUTPATIENT
Start: 2019-07-12 | End: 2019-07-12

## 2019-07-12 RX ORDER — ONDANSETRON 8 MG/1
4 TABLET, FILM COATED ORAL ONCE
Refills: 0 | Status: DISCONTINUED | OUTPATIENT
Start: 2019-07-12 | End: 2019-07-12

## 2019-07-12 RX ORDER — FENTANYL CITRATE 50 UG/ML
50 INJECTION INTRAVENOUS
Refills: 0 | Status: DISCONTINUED | OUTPATIENT
Start: 2019-07-12 | End: 2019-07-12

## 2019-07-12 RX ORDER — ESCITALOPRAM OXALATE 10 MG/1
1 TABLET, FILM COATED ORAL
Qty: 0 | Refills: 0 | DISCHARGE

## 2019-07-12 RX ADMIN — Medication 975 MILLIGRAM(S): at 10:15

## 2019-07-12 RX ADMIN — OXYCODONE HYDROCHLORIDE 10 MILLIGRAM(S): 5 TABLET ORAL at 16:15

## 2019-07-12 RX ADMIN — FENTANYL CITRATE 50 MICROGRAM(S): 50 INJECTION INTRAVENOUS at 17:08

## 2019-07-12 RX ADMIN — Medication 975 MILLIGRAM(S): at 10:14

## 2019-07-12 RX ADMIN — OXYCODONE HYDROCHLORIDE 10 MILLIGRAM(S): 5 TABLET ORAL at 17:08

## 2019-07-12 RX ADMIN — SODIUM CHLORIDE 75 MILLILITER(S): 9 INJECTION, SOLUTION INTRAVENOUS at 17:09

## 2019-07-12 RX ADMIN — APREPITANT 40 MILLIGRAM(S): 80 CAPSULE ORAL at 10:15

## 2019-07-12 RX ADMIN — FENTANYL CITRATE 50 MICROGRAM(S): 50 INJECTION INTRAVENOUS at 16:15

## 2019-07-12 NOTE — ASU DISCHARGE PLAN (ADULT/PEDIATRIC) - CARE PROVIDER_API CALL
Tevin Sanchez)  Plastic Surgery  [address], [address]  [place] [address]  Phone: [phone]  Fax: [phone]  Follow Up Time:

## 2019-07-12 NOTE — ASU DISCHARGE PLAN (ADULT/PEDIATRIC) - CALL YOUR DOCTOR IF YOU HAVE ANY OF THE FOLLOWING:
Wound/Surgical Site with redness, or foul smelling discharge or pus/Swelling that gets worse/Bleeding that does not stop

## 2019-07-14 ENCOUNTER — TRANSCRIPTION ENCOUNTER (OUTPATIENT)
Age: 39
End: 2019-07-14

## 2019-07-16 DIAGNOSIS — Z88.1 ALLERGY STATUS TO OTHER ANTIBIOTIC AGENTS STATUS: ICD-10-CM

## 2019-07-16 DIAGNOSIS — N62 HYPERTROPHY OF BREAST: ICD-10-CM

## 2019-07-16 DIAGNOSIS — Z87.891 PERSONAL HISTORY OF NICOTINE DEPENDENCE: ICD-10-CM

## 2019-07-16 LAB — SURGICAL PATHOLOGY STUDY: SIGNIFICANT CHANGE UP

## 2019-07-25 ENCOUNTER — NON-APPOINTMENT (OUTPATIENT)
Age: 39
End: 2019-07-25

## 2019-07-25 ENCOUNTER — APPOINTMENT (OUTPATIENT)
Dept: INTERNAL MEDICINE | Facility: CLINIC | Age: 39
End: 2019-07-25
Payer: COMMERCIAL

## 2019-07-25 VITALS
SYSTOLIC BLOOD PRESSURE: 120 MMHG | HEART RATE: 87 BPM | HEIGHT: 66.5 IN | DIASTOLIC BLOOD PRESSURE: 72 MMHG | BODY MASS INDEX: 29.86 KG/M2 | WEIGHT: 188 LBS | OXYGEN SATURATION: 99 % | TEMPERATURE: 97.8 F

## 2019-07-25 VITALS — SYSTOLIC BLOOD PRESSURE: 110 MMHG | DIASTOLIC BLOOD PRESSURE: 70 MMHG

## 2019-07-25 DIAGNOSIS — R68.89 OTHER GENERAL SYMPTOMS AND SIGNS: ICD-10-CM

## 2019-07-25 DIAGNOSIS — F32.9 MAJOR DEPRESSIVE DISORDER, SINGLE EPISODE, UNSPECIFIED: ICD-10-CM

## 2019-07-25 DIAGNOSIS — J10.1 INFLUENZA DUE TO OTHER IDENTIFIED INFLUENZA VIRUS WITH OTHER RESPIRATORY MANIFESTATIONS: ICD-10-CM

## 2019-07-25 DIAGNOSIS — Z86.79 PERSONAL HISTORY OF OTHER DISEASES OF THE CIRCULATORY SYSTEM: ICD-10-CM

## 2019-07-25 PROCEDURE — 93000 ELECTROCARDIOGRAM COMPLETE: CPT

## 2019-07-25 PROCEDURE — 99396 PREV VISIT EST AGE 40-64: CPT | Mod: 25

## 2019-07-25 PROCEDURE — 36415 COLL VENOUS BLD VENIPUNCTURE: CPT

## 2019-07-26 ENCOUNTER — OTHER (OUTPATIENT)
Age: 39
End: 2019-07-26

## 2019-07-26 ENCOUNTER — RESULT CHARGE (OUTPATIENT)
Age: 39
End: 2019-07-26

## 2019-07-26 LAB
25(OH)D3 SERPL-MCNC: 31.1 NG/ML
ALBUMIN SERPL ELPH-MCNC: 4.4 G/DL
ALP BLD-CCNC: 72 U/L
ALT SERPL-CCNC: 12 U/L
ANION GAP SERPL CALC-SCNC: 11 MMOL/L
AST SERPL-CCNC: 11 U/L
BILIRUB SERPL-MCNC: <0.2 MG/DL
BUN SERPL-MCNC: 14 MG/DL
CALCIUM SERPL-MCNC: 9.4 MG/DL
CHLORIDE SERPL-SCNC: 103 MMOL/L
CHOLEST SERPL-MCNC: 195 MG/DL
CHOLEST/HDLC SERPL: 3.7 RATIO
CO2 SERPL-SCNC: 25 MMOL/L
CREAT SERPL-MCNC: 0.9 MG/DL
GLUCOSE SERPL-MCNC: 103 MG/DL
HDLC SERPL-MCNC: 53 MG/DL
LDLC SERPL CALC-MCNC: 121 MG/DL
POTASSIUM SERPL-SCNC: 5.1 MMOL/L
PROT SERPL-MCNC: 7 G/DL
SODIUM SERPL-SCNC: 139 MMOL/L
T4 FREE SERPL-MCNC: 1.1 NG/DL
TRIGL SERPL-MCNC: 104 MG/DL
TSH SERPL-ACNC: 0.82 UIU/ML

## 2019-07-27 PROBLEM — F32.9 DEPRESSION, REACTIVE: Status: RESOLVED | Noted: 2018-07-21 | Resolved: 2019-07-27

## 2019-07-27 PROBLEM — R68.89 FLU-LIKE SYMPTOMS: Status: RESOLVED | Noted: 2019-04-02 | Resolved: 2019-07-27

## 2019-07-27 PROBLEM — Z86.79 HISTORY OF SINUS BRADYCARDIA: Status: RESOLVED | Noted: 2018-07-21 | Resolved: 2019-07-27

## 2019-07-27 PROBLEM — J10.1 INFLUENZA A: Status: RESOLVED | Noted: 2018-12-14 | Resolved: 2019-07-27

## 2019-07-27 NOTE — ASSESSMENT
[FreeTextEntry1] : I reinforced ongoing smoking cessation and applauded her for quiting. .\par She will followup with plastic surgery post her reduction mammoplasty. She appears to have a incisional infection. She will resume exercise when tolerated\par She will work on weight reduction thru caloric restriction.\par She is up-to-date with all vaccinations.\par She will continue with regular GYN followup\par Screening blood work was sent

## 2019-07-27 NOTE — HEALTH RISK ASSESSMENT
[Yes] : Yes [2 - 4 times a month (2 pts)] : 2-4 times a month (2 points) [3 or 4 (1 pt)] : 3 or 4  (1 point) [Less than monthly (1 pt)] : Less than monthly (1 point) [No] : In the past 12 months have you used drugs other than those required for medical reasons? No [Patient reported PAP Smear was normal] : Patient reported PAP Smear was normal [None] : None [With Family] : lives with family [Employed] : employed [High School] : high school [Single] : single [Feels Safe at Home] : Feels safe at home [Fully functional (bathing, dressing, toileting, transferring, walking, feeding)] : Fully functional (bathing, dressing, toileting, transferring, walking, feeding) [Fully functional (using the telephone, shopping, preparing meals, housekeeping, doing laundry, using] : Fully functional and needs no help or supervision to perform IADLs (using the telephone, shopping, preparing meals, housekeeping, doing laundry, using transportation, managing medications and managing finances) [Reports normal functional visual acuity (ie: able to read med bottle)] : Reports normal functional visual acuity [Reports changes in dental health] : Reports changes in dental health [Smoke Detector] : smoke detector [Carbon Monoxide Detector] : carbon monoxide detector [Seat Belt] :  uses seat belt [Sunscreen] : uses sunscreen [21-25] : 21-25 [No falls in past year] : Patient reported no falls in the past year [0] : 2) Feeling down, depressed, or hopeless: Not at all (0) [YearQuit] : 6/30/2019 [de-identified] : none now due to surgery [de-identified] : tries to eat healthy [KIG2Sorny] : 0 [Change in mental status noted] : No change in mental status noted [Language] : denies difficulty with language [Behavior] : denies difficulty with behavior [Learning/Retaining New Information] : denies difficulty learning/retaining new information [Handling Complex Tasks] : denies difficulty handling complex tasks [Reasoning] : denies difficulty with reasoning [Spatial Ability and Orientation] : denies difficulty with spatial ability and orientation [Sexually Active] : not sexually active [High Risk Behavior] : no high risk behavior [Reports changes in vision] : Reports no changes in vision [Reports changes in hearing] : Reports no changes in hearing [Travel to Developing Areas] : does not  travel to developing areas [Guns at Home] : no guns at home [MammogramDate] : 6/2019 [Caregiver Concerns] : does not have caregiver concerns [MammogramComments] : birads 1 [PapSmearDate] : 2019

## 2019-07-27 NOTE — REVIEW OF SYSTEMS
[Palpitations] : palpitations [Insomnia] : insomnia [Anxiety] : anxiety [Negative] : Heme/Lymph [FreeTextEntry5] : when stressed

## 2019-07-27 NOTE — HISTORY OF PRESENT ILLNESS
[FreeTextEntry1] : CPE [de-identified] : She is status post reduction mammoplasty on July 12, 2019. She did well. She has been on cefadroxil for the past 2 days due to mild incisional infection on right breast . She quit smoking on June 30, 2019. She had been smoking since  ninth grade. She is so for doing well. She feels well on Lexapro and has  not had any panic attacks. She is using clonazepam to help her sleep at night. She states her menses are regular. She sees the gynecologist.

## 2019-07-27 NOTE — PHYSICAL EXAM
[No Acute Distress] : no acute distress [Well Nourished] : well nourished [Well Developed] : well developed [Well-Appearing] : well-appearing [Normal Voice/Communication] : normal voice/communication [Normal Sclera/Conjunctiva] : normal sclera/conjunctiva [PERRL] : pupils equal round and reactive to light [EOMI] : extraocular movements intact [Normal Outer Ear/Nose] : the outer ears and nose were normal in appearance [Normal Oropharynx] : the oropharynx was normal [No JVD] : no jugular venous distention [No Lymphadenopathy] : no lymphadenopathy [Supple] : supple [Thyroid Normal, No Nodules] : the thyroid was normal and there were no nodules present [No Respiratory Distress] : no respiratory distress  [No Accessory Muscle Use] : no accessory muscle use [Clear to Auscultation] : lungs were clear to auscultation bilaterally [Normal Rate] : normal rate  [Regular Rhythm] : with a regular rhythm [Normal S1, S2] : normal S1 and S2 [No Murmur] : no murmur heard [No Carotid Bruits] : no carotid bruits [No Abdominal Bruit] : a ~M bruit was not heard ~T in the abdomen [No Varicosities] : no varicosities [Pedal Pulses Present] : the pedal pulses are present [No Edema] : there was no peripheral edema [No Palpable Aorta] : no palpable aorta [No Extremity Clubbing/Cyanosis] : no extremity clubbing/cyanosis [Soft] : abdomen soft [Non Tender] : non-tender [Non-distended] : non-distended [No Masses] : no abdominal mass palpated [No HSM] : no HSM [Normal Bowel Sounds] : normal bowel sounds [Normal Posterior Cervical Nodes] : no posterior cervical lymphadenopathy [Normal Anterior Cervical Nodes] : no anterior cervical lymphadenopathy [No CVA Tenderness] : no CVA  tenderness [No Spinal Tenderness] : no spinal tenderness [No Joint Swelling] : no joint swelling [Grossly Normal Strength/Tone] : grossly normal strength/tone [No Rash] : no rash [Coordination Grossly Intact] : coordination grossly intact [No Focal Deficits] : no focal deficits [Normal Gait] : normal gait [Deep Tendon Reflexes (DTR)] : deep tendon reflexes were 2+ and symmetric [Normal Affect] : the affect was normal [Normal Insight/Judgement] : insight and judgment were intact [Normal Supraclavicular Nodes] : no supraclavicular lymphadenopathy [Normal Inguinal Nodes] : no inguinal lymphadenopathy [Speech Grossly Normal] : speech grossly normal [Memory Grossly Normal] : memory grossly normal [Alert and Oriented x3] : oriented to person, place, and time [Normal Mood] : the mood was normal [Kyphosis] : no kyphosis [Scoliosis] : no scoliosis [de-identified] : Status post reduction mammoplasty. There is mild erythema along the right vertical incision under the breast and periareolar. There is no discharge or mastitis. The left breast incisions look fine

## 2019-08-02 ENCOUNTER — MEDICATION RENEWAL (OUTPATIENT)
Age: 39
End: 2019-08-02

## 2019-11-13 ENCOUNTER — APPOINTMENT (OUTPATIENT)
Dept: INTERNAL MEDICINE | Facility: CLINIC | Age: 39
End: 2019-11-13
Payer: COMMERCIAL

## 2019-11-13 VITALS
WEIGHT: 184 LBS | TEMPERATURE: 97.8 F | HEIGHT: 66 IN | DIASTOLIC BLOOD PRESSURE: 66 MMHG | HEART RATE: 83 BPM | BODY MASS INDEX: 29.57 KG/M2 | OXYGEN SATURATION: 96 % | SYSTOLIC BLOOD PRESSURE: 110 MMHG

## 2019-11-13 PROBLEM — N62 HYPERTROPHY OF BREAST: Chronic | Status: ACTIVE | Noted: 2019-07-02

## 2019-11-13 PROBLEM — M51.26 OTHER INTERVERTEBRAL DISC DISPLACEMENT, LUMBAR REGION: Chronic | Status: ACTIVE | Noted: 2019-07-12

## 2019-11-13 PROCEDURE — 99214 OFFICE O/P EST MOD 30 MIN: CPT

## 2019-11-13 RX ORDER — CEFADROXIL 500 MG/1
500 CAPSULE ORAL
Qty: 14 | Refills: 0 | Status: DISCONTINUED | COMMUNITY
End: 2019-11-13

## 2019-11-13 NOTE — HISTORY OF PRESENT ILLNESS
[FreeTextEntry8] : HUGH SAMSON is a 38 yo woman with some mild left eye discomfort, discharge this am.  The patient reports the discharge has resolved.  Not pink now.  Improved with warm soaks.  Vision normal\par \par No fevers.  Feels otherwise well

## 2019-11-13 NOTE — REVIEW OF SYSTEMS
[Fever] : no fever [Discharge] : discharge [Earache] : no earache [Chest Pain] : no chest pain [Skin Rash] : no skin rash [Shortness Of Breath] : no shortness of breath [Headache] : no headache

## 2019-11-13 NOTE — PHYSICAL EXAM
[No Acute Distress] : no acute distress [Normal Sclera/Conjunctiva] : normal sclera/conjunctiva [Well Nourished] : well nourished [PERRL] : pupils equal round and reactive to light [Normal Outer Ear/Nose] : the outer ears and nose were normal in appearance [EOMI] : extraocular movements intact [Normal TMs] : both tympanic membranes were normal [Normal Oropharynx] : the oropharynx was normal [No Lymphadenopathy] : no lymphadenopathy [No JVD] : no jugular venous distention [No Respiratory Distress] : no respiratory distress  [Supple] : supple [No Accessory Muscle Use] : no accessory muscle use [Clear to Auscultation] : lungs were clear to auscultation bilaterally [Normal Rate] : normal rate  [Normal S1, S2] : normal S1 and S2 [Regular Rhythm] : with a regular rhythm [Normal Gait] : normal gait [No Murmur] : no murmur heard [Alert and Oriented x3] : oriented to person, place, and time

## 2019-11-15 ENCOUNTER — MEDICATION RENEWAL (OUTPATIENT)
Age: 39
End: 2019-11-15

## 2019-12-13 ENCOUNTER — RX RENEWAL (OUTPATIENT)
Age: 39
End: 2019-12-13

## 2020-01-02 ENCOUNTER — APPOINTMENT (OUTPATIENT)
Dept: INTERNAL MEDICINE | Facility: CLINIC | Age: 40
End: 2020-01-02
Payer: COMMERCIAL

## 2020-01-02 VITALS
HEIGHT: 66 IN | HEART RATE: 92 BPM | TEMPERATURE: 98.7 F | BODY MASS INDEX: 30.86 KG/M2 | WEIGHT: 192 LBS | OXYGEN SATURATION: 99 %

## 2020-01-02 PROCEDURE — 99213 OFFICE O/P EST LOW 20 MIN: CPT

## 2020-01-02 NOTE — ASSESSMENT
[FreeTextEntry1] : She appears have  an acute gastroenteritis. She may use Compazine 10 mg every 6 hours as needed. We discussed the importance of maintaining hydration with liquids Pedialyte Gatorade ice chips. She may also use Pepto-Bismol p.r.n. If she is unable to tolerate oral Compazine will switch her to Compazine suppositories which she deferred presently. She will notify me for any increasing abdominal pain or fever or inability to hold down fluids over the next 24 hours

## 2020-01-02 NOTE — HISTORY OF PRESENT ILLNESS
[FreeTextEntry8] : She is being seen acutely as she had the onset of nausea vomiting and diarrhea last evening at 10:30 PM. She has had no fever. She did not eat any unusual foods and no one else is ill. She is already had 4 loose bowel movements this morning. She has  no abdominal pain.   There  has been no travel and no antibiotic use . She is not taking anything for this.

## 2020-01-02 NOTE — PHYSICAL EXAM
[de-identified] : Occasional dry he [de-identified] : Minimal left lower quadrant discomfort no rebound or guarding [de-identified] : Oral mucosa moist

## 2020-02-01 LAB
ESTIMATED AVERAGE GLUCOSE: 114 MG/DL
HBA1C MFR BLD HPLC: 5.6 %

## 2020-02-02 LAB — GLUCOSE BS SERPL-MCNC: 71 MG/DL

## 2020-02-03 ENCOUNTER — APPOINTMENT (OUTPATIENT)
Dept: INTERNAL MEDICINE | Facility: CLINIC | Age: 40
End: 2020-02-03

## 2020-02-04 ENCOUNTER — APPOINTMENT (OUTPATIENT)
Dept: INTERNAL MEDICINE | Facility: CLINIC | Age: 40
End: 2020-02-04
Payer: COMMERCIAL

## 2020-02-04 VITALS
TEMPERATURE: 98.7 F | WEIGHT: 191 LBS | DIASTOLIC BLOOD PRESSURE: 66 MMHG | BODY MASS INDEX: 30.7 KG/M2 | HEIGHT: 66 IN | HEART RATE: 73 BPM | SYSTOLIC BLOOD PRESSURE: 90 MMHG | OXYGEN SATURATION: 99 %

## 2020-02-04 DIAGNOSIS — Z87.09 PERSONAL HISTORY OF OTHER DISEASES OF THE RESPIRATORY SYSTEM: ICD-10-CM

## 2020-02-04 LAB
FLUAV SPEC QL CULT: NORMAL
FLUBV AG SPEC QL IA: NORMAL
S PYO AG SPEC QL IA: NORMAL

## 2020-02-04 PROCEDURE — 87804 INFLUENZA ASSAY W/OPTIC: CPT | Mod: 59,QW

## 2020-02-04 PROCEDURE — 99214 OFFICE O/P EST MOD 30 MIN: CPT | Mod: 25

## 2020-02-04 PROCEDURE — 87880 STREP A ASSAY W/OPTIC: CPT | Mod: QW

## 2020-02-04 NOTE — PHYSICAL EXAM
[No Acute Distress] : no acute distress [Well Nourished] : well nourished [Well Developed] : well developed [Normal Sclera/Conjunctiva] : normal sclera/conjunctiva [Normal Outer Ear/Nose] : the outer ears and nose were normal in appearance [Normal TMs] : both tympanic membranes were normal [Supple] : supple [No Respiratory Distress] : no respiratory distress  [No Accessory Muscle Use] : no accessory muscle use [Clear to Auscultation] : lungs were clear to auscultation bilaterally [Regular Rhythm] : with a regular rhythm [Normal S1, S2] : normal S1 and S2 [Normal Gait] : normal gait [Alert and Oriented x3] : oriented to person, place, and time [de-identified] : red pharynx [de-identified] : Mild LAD

## 2020-02-04 NOTE — REVIEW OF SYSTEMS
[Fever] : no fever [Vision Problems] : no vision problems [Nasal Discharge] : no nasal discharge [Sore Throat] : sore throat [Shortness Of Breath] : no shortness of breath [Cough] : cough [Chest Pain] : no chest pain

## 2020-02-04 NOTE — HISTORY OF PRESENT ILLNESS
[FreeTextEntry8] : HUGH SAMSON is a 40 yo woman with sore throat, chills, feeling ill since yesterday.  Denies fever, chest pain, sob. Some dry cough\par \par Anxiety stable on lexapro\par \par Non smoker

## 2020-02-06 LAB — HETEROPH AB SER QL: NEGATIVE

## 2020-02-07 ENCOUNTER — APPOINTMENT (OUTPATIENT)
Dept: INTERNAL MEDICINE | Facility: CLINIC | Age: 40
End: 2020-02-07
Payer: COMMERCIAL

## 2020-02-07 VITALS
HEIGHT: 66 IN | OXYGEN SATURATION: 98 % | TEMPERATURE: 98.3 F | WEIGHT: 197 LBS | SYSTOLIC BLOOD PRESSURE: 80 MMHG | DIASTOLIC BLOOD PRESSURE: 60 MMHG | BODY MASS INDEX: 31.66 KG/M2 | HEART RATE: 64 BPM

## 2020-02-07 VITALS — HEART RATE: 68 BPM | SYSTOLIC BLOOD PRESSURE: 90 MMHG | DIASTOLIC BLOOD PRESSURE: 60 MMHG

## 2020-02-07 DIAGNOSIS — T81.49XA INFECTION FOLLOWING A PROCEDURE, OTHER SURGICAL SITE, INITIAL ENCOUNTER: ICD-10-CM

## 2020-02-07 DIAGNOSIS — Z87.898 PERSONAL HISTORY OF OTHER SPECIFIED CONDITIONS: ICD-10-CM

## 2020-02-07 DIAGNOSIS — Z87.09 PERSONAL HISTORY OF OTHER DISEASES OF THE RESPIRATORY SYSTEM: ICD-10-CM

## 2020-02-07 DIAGNOSIS — F17.200 NICOTINE DEPENDENCE, UNSPECIFIED, UNCOMPLICATED: ICD-10-CM

## 2020-02-07 LAB
ALBUMIN SERPL ELPH-MCNC: 4.5 G/DL
ALP BLD-CCNC: 68 U/L
ALT SERPL-CCNC: 12 U/L
ANION GAP SERPL CALC-SCNC: 11 MMOL/L
AST SERPL-CCNC: 12 U/L
BACTERIA THROAT CULT: NORMAL
BASOPHILS # BLD AUTO: 0.03 K/UL
BASOPHILS NFR BLD AUTO: 0.4 %
BILIRUB SERPL-MCNC: 0.6 MG/DL
BUN SERPL-MCNC: 10 MG/DL
CALCIUM SERPL-MCNC: 9.6 MG/DL
CHLORIDE SERPL-SCNC: 103 MMOL/L
CO2 SERPL-SCNC: 24 MMOL/L
CREAT SERPL-MCNC: 0.76 MG/DL
EOSINOPHIL # BLD AUTO: 0.08 K/UL
EOSINOPHIL NFR BLD AUTO: 1 %
GLUCOSE SERPL-MCNC: 92 MG/DL
HCT VFR BLD CALC: 41.3 %
HGB BLD-MCNC: 13.7 G/DL
IMM GRANULOCYTES NFR BLD AUTO: 0.7 %
LYMPHOCYTES # BLD AUTO: 1.36 K/UL
LYMPHOCYTES NFR BLD AUTO: 16.2 %
MAN DIFF?: NORMAL
MCHC RBC-ENTMCNC: 30.1 PG
MCHC RBC-ENTMCNC: 33.2 GM/DL
MCV RBC AUTO: 90.8 FL
MONOCYTES # BLD AUTO: 0.54 K/UL
MONOCYTES NFR BLD AUTO: 6.4 %
NEUTROPHILS # BLD AUTO: 6.35 K/UL
NEUTROPHILS NFR BLD AUTO: 75.3 %
PLATELET # BLD AUTO: 229 K/UL
POTASSIUM SERPL-SCNC: 4.3 MMOL/L
PROT SERPL-MCNC: 7.3 G/DL
RBC # BLD: 4.55 M/UL
RBC # FLD: 13.8 %
SODIUM SERPL-SCNC: 138 MMOL/L
WBC # FLD AUTO: 8.42 K/UL

## 2020-02-07 PROCEDURE — 36415 COLL VENOUS BLD VENIPUNCTURE: CPT

## 2020-02-07 PROCEDURE — 99214 OFFICE O/P EST MOD 30 MIN: CPT | Mod: 25

## 2020-02-07 RX ORDER — PROCHLORPERAZINE MALEATE 10 MG/1
10 TABLET ORAL EVERY 6 HOURS
Qty: 10 | Refills: 0 | Status: DISCONTINUED | COMMUNITY
Start: 2020-01-02 | End: 2020-02-07

## 2020-02-08 ENCOUNTER — RESULT CHARGE (OUTPATIENT)
Age: 40
End: 2020-02-08

## 2020-02-08 PROBLEM — Z87.898 HISTORY OF NAUSEA AND VOMITING: Status: RESOLVED | Noted: 2020-01-02 | Resolved: 2020-02-08

## 2020-02-08 PROBLEM — T81.49XA INCISIONAL INFECTION: Status: RESOLVED | Noted: 2019-07-27 | Resolved: 2020-02-08

## 2020-02-08 LAB — RAPID RVP RESULT: NOT DETECTED

## 2020-02-08 NOTE — ASSESSMENT
[FreeTextEntry1] : Patient's exam is nonfocal except for borderline blood pressure. Her throat looks fine and  she has no adenopathy. I will send off a regular throat culture as well as an RVP. At this point she was told to stop the doxycycline as I am  not sure what we are  treating and it certainly would not treated strep throat. We'll also check a CBC comprehensive metabolic panel.   With a relatively low blood pressure she was advised to increase fluids and salt in her diet and observe for now. She may take Advil PRN for pain

## 2020-02-08 NOTE — HISTORY OF PRESENT ILLNESS
[FreeTextEntry8] : She was seen here in the office 3 days ago as she woke up on February 4 with her "throat on fire". She  had negative rapid flu and  strep and subsequently a mono test and throat culture were  negative. She's been taking doxycycline since that time. Her throat still hurts. She has some mild nasal congestion and is blowing some brown mucus. She cleared her throat this morning and brought up a little   mucus.   She has not had any fever.  She feels tired and a little bit achy. No one else has been ill.   There is no  travel or pets. Since her gastroenteritis on January 2 patient is aware that sometimes if she moves quickly she can feel a little bit dizzy.  She is eating and drinking well. \par She did not get the flu vaccine this year .....she refused it. She works in a medical office as a checkout

## 2020-02-08 NOTE — PHYSICAL EXAM
[Well Nourished] : well nourished [No Acute Distress] : no acute distress [Well-Appearing] : well-appearing [Well Developed] : well developed [PERRL] : pupils equal round and reactive to light [Normal Sclera/Conjunctiva] : normal sclera/conjunctiva [EOMI] : extraocular movements intact [Normal Outer Ear/Nose] : the outer ears and nose were normal in appearance [Normal Oropharynx] : the oropharynx was normal [No JVD] : no jugular venous distention [Thyroid Normal, No Nodules] : the thyroid was normal and there were no nodules present [Supple] : supple [No Lymphadenopathy] : no lymphadenopathy [No Respiratory Distress] : no respiratory distress  [Normal Rate] : normal rate  [No Accessory Muscle Use] : no accessory muscle use [Clear to Auscultation] : lungs were clear to auscultation bilaterally [No Murmur] : no murmur heard [Normal S1, S2] : normal S1 and S2 [Regular Rhythm] : with a regular rhythm [No Carotid Bruits] : no carotid bruits [Pedal Pulses Present] : the pedal pulses are present [No Edema] : there was no peripheral edema [Soft] : abdomen soft [Non-distended] : non-distended [Non Tender] : non-tender [No Masses] : no abdominal mass palpated [No HSM] : no HSM [Normal Bowel Sounds] : normal bowel sounds [Normal Posterior Cervical Nodes] : no posterior cervical lymphadenopathy [Normal Anterior Cervical Nodes] : no anterior cervical lymphadenopathy [No CVA Tenderness] : no CVA  tenderness [No Spinal Tenderness] : no spinal tenderness [No Joint Swelling] : no joint swelling [Grossly Normal Strength/Tone] : grossly normal strength/tone [No Rash] : no rash [Normal Insight/Judgement] : insight and judgment were intact [Normal Affect] : the affect was normal [Normal Voice/Communication] : normal voice/communication [Normal TMs] : both tympanic membranes were normal [Normal Percussion] : the chest was normal to percussion [Normal Supraclavicular Nodes] : no supraclavicular lymphadenopathy [Normal Inguinal Nodes] : no inguinal lymphadenopathy [Speech Grossly Normal] : speech grossly normal [Memory Grossly Normal] : memory grossly normal [Alert and Oriented x3] : oriented to person, place, and time [Normal Mood] : the mood was normal

## 2020-02-08 NOTE — PHYSICAL EXAM
[Well Nourished] : well nourished [No Acute Distress] : no acute distress [Well-Appearing] : well-appearing [Well Developed] : well developed [PERRL] : pupils equal round and reactive to light [Normal Sclera/Conjunctiva] : normal sclera/conjunctiva [EOMI] : extraocular movements intact [Normal Outer Ear/Nose] : the outer ears and nose were normal in appearance [No JVD] : no jugular venous distention [Normal Oropharynx] : the oropharynx was normal [No Lymphadenopathy] : no lymphadenopathy [Thyroid Normal, No Nodules] : the thyroid was normal and there were no nodules present [Supple] : supple [No Respiratory Distress] : no respiratory distress  [Clear to Auscultation] : lungs were clear to auscultation bilaterally [No Accessory Muscle Use] : no accessory muscle use [Normal Rate] : normal rate  [No Murmur] : no murmur heard [Regular Rhythm] : with a regular rhythm [Normal S1, S2] : normal S1 and S2 [No Carotid Bruits] : no carotid bruits [No Edema] : there was no peripheral edema [Pedal Pulses Present] : the pedal pulses are present [Soft] : abdomen soft [Non-distended] : non-distended [Non Tender] : non-tender [No HSM] : no HSM [Normal Bowel Sounds] : normal bowel sounds [No Masses] : no abdominal mass palpated [No CVA Tenderness] : no CVA  tenderness [Normal Posterior Cervical Nodes] : no posterior cervical lymphadenopathy [Normal Anterior Cervical Nodes] : no anterior cervical lymphadenopathy [No Spinal Tenderness] : no spinal tenderness [No Joint Swelling] : no joint swelling [Grossly Normal Strength/Tone] : grossly normal strength/tone [No Rash] : no rash [Normal Affect] : the affect was normal [Normal Insight/Judgement] : insight and judgment were intact [Normal Voice/Communication] : normal voice/communication [Normal TMs] : both tympanic membranes were normal [Normal Percussion] : the chest was normal to percussion [Normal Supraclavicular Nodes] : no supraclavicular lymphadenopathy [Normal Inguinal Nodes] : no inguinal lymphadenopathy [Speech Grossly Normal] : speech grossly normal [Memory Grossly Normal] : memory grossly normal [Alert and Oriented x3] : oriented to person, place, and time [Normal Mood] : the mood was normal

## 2020-02-09 LAB — BACTERIA THROAT CULT: NORMAL

## 2020-10-29 ENCOUNTER — RESULT REVIEW (OUTPATIENT)
Age: 40
End: 2020-10-29

## 2020-12-23 PROBLEM — Z87.09 HISTORY OF ACUTE PHARYNGITIS: Status: RESOLVED | Noted: 2020-02-07 | Resolved: 2020-12-23

## 2020-12-23 PROBLEM — Z87.09 HISTORY OF SORE THROAT: Status: RESOLVED | Noted: 2020-02-04 | Resolved: 2020-12-23

## 2021-01-08 ENCOUNTER — APPOINTMENT (OUTPATIENT)
Dept: INTERNAL MEDICINE | Facility: CLINIC | Age: 41
End: 2021-01-08
Payer: COMMERCIAL

## 2021-01-08 VITALS
HEART RATE: 74 BPM | OXYGEN SATURATION: 99 % | TEMPERATURE: 97.7 F | WEIGHT: 225 LBS | SYSTOLIC BLOOD PRESSURE: 92 MMHG | BODY MASS INDEX: 35.73 KG/M2 | HEIGHT: 66.5 IN | DIASTOLIC BLOOD PRESSURE: 60 MMHG

## 2021-01-08 VITALS — DIASTOLIC BLOOD PRESSURE: 60 MMHG | SYSTOLIC BLOOD PRESSURE: 96 MMHG

## 2021-01-08 DIAGNOSIS — Z87.898 PERSONAL HISTORY OF OTHER SPECIFIED CONDITIONS: ICD-10-CM

## 2021-01-08 DIAGNOSIS — Z80.0 FAMILY HISTORY OF MALIGNANT NEOPLASM OF DIGESTIVE ORGANS: ICD-10-CM

## 2021-01-08 DIAGNOSIS — Z86.69 PERSONAL HISTORY OF OTHER DISEASES OF THE NERVOUS SYSTEM AND SENSE ORGANS: ICD-10-CM

## 2021-01-08 DIAGNOSIS — R73.09 OTHER ABNORMAL GLUCOSE: ICD-10-CM

## 2021-01-08 PROCEDURE — 99396 PREV VISIT EST AGE 40-64: CPT | Mod: 25

## 2021-01-08 PROCEDURE — 99072 ADDL SUPL MATRL&STAF TM PHE: CPT

## 2021-01-08 PROCEDURE — 93000 ELECTROCARDIOGRAM COMPLETE: CPT | Mod: 59

## 2021-01-08 PROCEDURE — G0444 DEPRESSION SCREEN ANNUAL: CPT

## 2021-01-08 RX ORDER — AMOXICILLIN 500 MG/1
500 CAPSULE ORAL
Qty: 30 | Refills: 0 | Status: DISCONTINUED | COMMUNITY
Start: 2020-02-08 | End: 2021-01-08

## 2021-01-08 RX ORDER — DOXYCYCLINE 100 MG/1
100 CAPSULE ORAL TWICE DAILY
Qty: 20 | Refills: 0 | Status: DISCONTINUED | COMMUNITY
Start: 2020-02-04 | End: 2021-01-08

## 2021-01-08 RX ORDER — ESCITALOPRAM OXALATE 20 MG/1
20 TABLET ORAL
Qty: 30 | Refills: 4 | Status: COMPLETED | COMMUNITY
Start: 2019-01-07 | End: 2021-01-08

## 2021-01-09 ENCOUNTER — NON-APPOINTMENT (OUTPATIENT)
Age: 41
End: 2021-01-09

## 2021-01-09 PROBLEM — Z86.69 HISTORY OF EYE PAIN: Status: RESOLVED | Noted: 2019-11-13 | Resolved: 2021-01-09

## 2021-01-09 PROBLEM — Z87.898 HISTORY OF PALPITATIONS: Status: RESOLVED | Noted: 2018-07-21 | Resolved: 2021-01-09

## 2021-01-09 PROBLEM — Z87.898 HISTORY OF DIZZINESS: Status: RESOLVED | Noted: 2020-02-07 | Resolved: 2021-01-09

## 2021-01-09 PROBLEM — R73.09 ABNORMAL GLUCOSE: Status: ACTIVE | Noted: 2019-07-26

## 2021-01-09 PROBLEM — Z87.898 HISTORY OF FATIGUE: Status: RESOLVED | Noted: 2020-02-07 | Resolved: 2021-01-09

## 2021-01-09 NOTE — HISTORY OF PRESENT ILLNESS
[FreeTextEntry1] : CPE [de-identified] : She is working at [employer] .  She has had  a bit of a stressful year as her mother required cervical spine surgery and  was found to be anemic and subsequently diagnosed with rectal cancer and is undergoing chemotherapy. Patient is coping. She has stopped her escitalopram and has only used rare alprazolam.  Patient's bowels and urine  are fine.   She has no chest pain shortness of breath or palpitations. She had an endometrial biopsy for menometrorrhagia which was negative. Her LMP was December 20, 2020. She is sexually active using condoms.   She does not  want any STD testing.  . She is due for a mammogram.   She has no chest pain shortness of breath or palpitations.  She does not  exercise but is planning to begin.  She is gaining weight overheating. She has refused the flu vaccine at work and also has refused Covid 19  vaccine despite being offered it.

## 2021-01-09 NOTE — ASSESSMENT
[FreeTextEntry1] : She was encouraged to resume a caloric restricted diet , exercise and  lose weight. She is given a referral for her yearly mammogram. She refuses a flu vaccine which she was advised to protect herself and family.   She was encouraged to  obtain a Covid 19 vaccine at work to protect herself and family. She will go for fasting blood work. She will be due for colonoscopy in 2022

## 2021-01-09 NOTE — HEALTH RISK ASSESSMENT
[No] : In the past 12 months have you used drugs other than those required for medical reasons? No [No falls in past year] : Patient reported no falls in the past year [0] : 1) Little interest or pleasure doing things: Not at all (0) [1] : 2) Feeling down, depressed, or hopeless for several days (1) [Patient reported PAP Smear was normal] : Patient reported PAP Smear was normal [HIV test declined] : HIV test declined [Hepatitis C test declined] : Hepatitis C test declined [None] : None [With Family] : lives with family [Employed] : employed [High School] : high school [Single] : single [Sexually Active] : sexually active [Fully functional (bathing, dressing, toileting, transferring, walking, feeding)] : Fully functional (bathing, dressing, toileting, transferring, walking, feeding) [Feels Safe at Home] : Feels safe at home [Fully functional (using the telephone, shopping, preparing meals, housekeeping, doing laundry, using] : Fully functional and needs no help or supervision to perform IADLs (using the telephone, shopping, preparing meals, housekeeping, doing laundry, using transportation, managing medications and managing finances) [Smoke Detector] : smoke detector [Carbon Monoxide Detector] : carbon monoxide detector [Seat Belt] :  uses seat belt [Sunscreen] : uses sunscreen [] : No [de-identified] : none [de-identified] : too many calories [[id number]] : 1 [Change in mental status noted] : No change in mental status noted [Language] : denies difficulty with language [Behavior] : denies difficulty with behavior [Learning/Retaining New Information] : denies difficulty learning/retaining new information [Handling Complex Tasks] : denies difficulty handling complex tasks [Reasoning] : denies difficulty with reasoning [Spatial Ability and Orientation] : denies difficulty with spatial ability and orientation [High Risk Behavior] : no high risk behavior [Guns at Home] : no guns at home [MammogramComments] : birads 1 [MammogramDate] : 6/2019 [PapSmearDate] : 2020 [ColonoscopyDate] : 2017 [FreeTextEntry2] :  [ColonoscopyComments] : no polyps

## 2021-01-09 NOTE — PHYSICAL EXAM
[No Acute Distress] : no acute distress [Well Nourished] : well nourished [Well Developed] : well developed [Well-Appearing] : well-appearing [Normal Voice/Communication] : normal voice/communication [Normal Sclera/Conjunctiva] : normal sclera/conjunctiva [PERRL] : pupils equal round and reactive to light [EOMI] : extraocular movements intact [Normal Outer Ear/Nose] : the outer ears and nose were normal in appearance [Normal Oropharynx] : the oropharynx was normal [Normal TMs] : both tympanic membranes were normal [No JVD] : no jugular venous distention [No Lymphadenopathy] : no lymphadenopathy [Supple] : supple [Thyroid Normal, No Nodules] : the thyroid was normal and there were no nodules present [No Respiratory Distress] : no respiratory distress  [No Accessory Muscle Use] : no accessory muscle use [Clear to Auscultation] : lungs were clear to auscultation bilaterally [Normal Percussion] : the chest was normal to percussion [Normal Rate] : normal rate  [Regular Rhythm] : with a regular rhythm [Normal S1, S2] : normal S1 and S2 [No Murmur] : no murmur heard [No Carotid Bruits] : no carotid bruits [No Abdominal Bruit] : a ~M bruit was not heard ~T in the abdomen [No Varicosities] : no varicosities [Pedal Pulses Present] : the pedal pulses are present [No Edema] : there was no peripheral edema [No Palpable Aorta] : no palpable aorta [No Extremity Clubbing/Cyanosis] : no extremity clubbing/cyanosis [Soft] : abdomen soft [Non Tender] : non-tender [Non-distended] : non-distended [No Masses] : no abdominal mass palpated [No HSM] : no HSM [Normal Bowel Sounds] : normal bowel sounds [Normal Supraclavicular Nodes] : no supraclavicular lymphadenopathy [Normal Axillary Nodes] : no axillary lymphadenopathy [Normal Posterior Cervical Nodes] : no posterior cervical lymphadenopathy [Normal Anterior Cervical Nodes] : no anterior cervical lymphadenopathy [Normal Inguinal Nodes] : no inguinal lymphadenopathy [No CVA Tenderness] : no CVA  tenderness [No Spinal Tenderness] : no spinal tenderness [No Joint Swelling] : no joint swelling [Grossly Normal Strength/Tone] : grossly normal strength/tone [No Rash] : no rash [No Skin Lesions] : no skin lesions [Coordination Grossly Intact] : coordination grossly intact [No Focal Deficits] : no focal deficits [Normal Gait] : normal gait [Deep Tendon Reflexes (DTR)] : deep tendon reflexes were 2+ and symmetric [Speech Grossly Normal] : speech grossly normal [Normal Affect] : the affect was normal [Alert and Oriented x3] : oriented to person, place, and time [Normal Mood] : the mood was normal [Normal Insight/Judgement] : insight and judgment were intact [Kyphosis] : no kyphosis [Scoliosis] : no scoliosis [de-identified] :  post reduction mammoplasty scars no masses or axillary nodes

## 2021-02-19 DIAGNOSIS — N60.19 DIFFUSE CYSTIC MASTOPATHY OF UNSPECIFIED BREAST: ICD-10-CM

## 2022-04-08 ENCOUNTER — NON-APPOINTMENT (OUTPATIENT)
Age: 42
End: 2022-04-08

## 2022-04-19 ENCOUNTER — APPOINTMENT (OUTPATIENT)
Dept: INTERNAL MEDICINE | Facility: CLINIC | Age: 42
End: 2022-04-19
Payer: COMMERCIAL

## 2022-04-19 ENCOUNTER — NON-APPOINTMENT (OUTPATIENT)
Age: 42
End: 2022-04-19

## 2022-04-19 DIAGNOSIS — Z86.010 PERSONAL HISTORY OF COLONIC POLYPS: ICD-10-CM

## 2022-04-19 DIAGNOSIS — Z87.891 PERSONAL HISTORY OF NICOTINE DEPENDENCE: ICD-10-CM

## 2022-04-19 PROCEDURE — 99203 OFFICE O/P NEW LOW 30 MIN: CPT | Mod: 25

## 2022-04-19 PROCEDURE — 99386 PREV VISIT NEW AGE 40-64: CPT | Mod: 25

## 2022-04-19 PROCEDURE — 36415 COLL VENOUS BLD VENIPUNCTURE: CPT

## 2022-04-19 PROCEDURE — G0442 ANNUAL ALCOHOL SCREEN 15 MIN: CPT

## 2022-04-19 PROCEDURE — 93000 ELECTROCARDIOGRAM COMPLETE: CPT | Mod: 59

## 2022-04-19 PROCEDURE — G0444 DEPRESSION SCREEN ANNUAL: CPT

## 2022-04-20 ENCOUNTER — NON-APPOINTMENT (OUTPATIENT)
Age: 42
End: 2022-04-20

## 2022-04-20 NOTE — PHYSICAL EXAM
Statement Selected [No Acute Distress] : no acute distress [Well Nourished] : well nourished [Well Developed] : well developed [Well-Appearing] : well-appearing [Normal Sclera/Conjunctiva] : normal sclera/conjunctiva [No Lymphadenopathy] : no lymphadenopathy [Thyroid Normal, No Nodules] : the thyroid was normal and there were no nodules present [No Respiratory Distress] : no respiratory distress  [No Accessory Muscle Use] : no accessory muscle use [Clear to Auscultation] : lungs were clear to auscultation bilaterally [Normal Rate] : normal rate  [Regular Rhythm] : with a regular rhythm [Normal S1, S2] : normal S1 and S2 [No Murmur] : no murmur heard [No Carotid Bruits] : no carotid bruits [No Abdominal Bruit] : a ~M bruit was not heard ~T in the abdomen [No Edema] : there was no peripheral edema [No Palpable Aorta] : no palpable aorta [Normal Appearance] : normal in appearance [No Masses] : no palpable masses [No Axillary Lymphadenopathy] : no axillary lymphadenopathy [Soft] : abdomen soft [Non Tender] : non-tender [Non-distended] : non-distended [Normal Bowel Sounds] : normal bowel sounds [Normal Supraclavicular Nodes] : no supraclavicular lymphadenopathy [Normal Axillary Nodes] : no axillary lymphadenopathy [Normal Posterior Cervical Nodes] : no posterior cervical lymphadenopathy [Normal Anterior Cervical Nodes] : no anterior cervical lymphadenopathy [No Spinal Tenderness] : no spinal tenderness [Normal Gait] : normal gait [Alert and Oriented x3] : oriented to person, place, and time [de-identified] : Mobile mass like area in lower abdomen/suprapubic area, slightly R of midline

## 2022-04-20 NOTE — REVIEW OF SYSTEMS
[Fever] : no fever [Chills] : no chills [Fatigue] : no fatigue [Vision Problems] : no vision problems [Nasal Discharge] : no nasal discharge [Sore Throat] : no sore throat [Chest Pain] : no chest pain [Palpitations] : no palpitations [Lower Ext Edema] : no lower extremity edema [Shortness Of Breath] : no shortness of breath [Wheezing] : no wheezing [Cough] : no cough [Abdominal Pain] : no abdominal pain [Nausea] : no nausea [Constipation] : no constipation [Diarrhea] : diarrhea [Vomiting] : no vomiting [Dysuria] : no dysuria [Hematuria] : no hematuria [Joint Pain] : no joint pain [Headache] : no headache [Dizziness] : no dizziness

## 2022-04-20 NOTE — HISTORY OF PRESENT ILLNESS
[FreeTextEntry1] : CPE, Vit D def [de-identified] : Darby presents as a new patient.  She is here for CPE.  She is a history of prior depression and vitamin D deficiency.  She reports that she previously had elements of anxiety, depression, and PTSD.  Symptoms have dramatically improved by going to the gym and working out.  She does use as needed clonazepam.  However, her use has decreased significantly and report symptoms are much better controlled given her new lifestyle.  She otherwise feels well and has no acute concerns.

## 2022-04-20 NOTE — HEALTH RISK ASSESSMENT
[Former] : Former [Yes] : Yes [Monthly or less (1 pt)] : Monthly or less (1 point) [1 or 2 (0 pts)] : 1 or 2 (0 points) [Never (0 pts)] : Never (0 points) [0] : 1) Little interest or pleasure doing things: Not at all (0) [1] : 2) Feeling down, depressed, or hopeless for several days (1) [Patient reported mammogram was normal] : Patient reported mammogram was normal [Patient reported PAP Smear was normal] : Patient reported PAP Smear was normal [Patient reported colonoscopy was abnormal] : Patient reported colonoscopy was abnormal [[id number]] : 1 [MammogramComments] : Due, Rx given [PapSmearComments] : Advised she follow up with GYN [ColonoscopyComments] : hx of polyps. Dr Eldridge. Due in 3 years

## 2022-06-21 DIAGNOSIS — Z86.59 PERSONAL HISTORY OF OTHER MENTAL AND BEHAVIORAL DISORDERS: ICD-10-CM

## 2022-06-22 ENCOUNTER — TRANSCRIPTION ENCOUNTER (OUTPATIENT)
Age: 42
End: 2022-06-22

## 2022-08-23 DIAGNOSIS — K82.4 CHOLESTEROLOSIS OF GALLBLADDER: ICD-10-CM

## 2022-09-18 NOTE — H&P PST ADULT - RESPIRATORY RATE (BREATHS/MIN)
Patient met SIRS criteria on arrival:  WBC 22 1  Lactate 10 6    Afebrile  Likely not infectious etiology, most likely 2/2 ongoing hemorrhage    Plan:  - f/u infectious workup, bcx, ucx 16

## 2022-10-04 ENCOUNTER — OUTPATIENT (OUTPATIENT)
Dept: OUTPATIENT SERVICES | Facility: HOSPITAL | Age: 42
LOS: 1 days | End: 2022-10-04
Payer: COMMERCIAL

## 2022-10-04 ENCOUNTER — APPOINTMENT (OUTPATIENT)
Dept: ULTRASOUND IMAGING | Facility: CLINIC | Age: 42
End: 2022-10-04

## 2022-10-04 DIAGNOSIS — Z98.89 OTHER SPECIFIED POSTPROCEDURAL STATES: Chronic | ICD-10-CM

## 2022-10-04 DIAGNOSIS — R19.00 INTRA-ABDOMINAL AND PELVIC SWELLING, MASS AND LUMP, UNSPECIFIED SITE: ICD-10-CM

## 2022-10-04 DIAGNOSIS — Z00.8 ENCOUNTER FOR OTHER GENERAL EXAMINATION: ICD-10-CM

## 2022-10-04 PROCEDURE — 76770 US EXAM ABDO BACK WALL COMP: CPT

## 2022-10-04 PROCEDURE — 76770 US EXAM ABDO BACK WALL COMP: CPT | Mod: 26

## 2022-10-05 ENCOUNTER — NON-APPOINTMENT (OUTPATIENT)
Age: 42
End: 2022-10-05

## 2022-10-11 ENCOUNTER — APPOINTMENT (OUTPATIENT)
Dept: INTERNAL MEDICINE | Facility: CLINIC | Age: 42
End: 2022-10-11

## 2022-10-11 VITALS
RESPIRATION RATE: 12 BRPM | DIASTOLIC BLOOD PRESSURE: 64 MMHG | HEART RATE: 56 BPM | HEIGHT: 66.5 IN | OXYGEN SATURATION: 99 % | WEIGHT: 168 LBS | BODY MASS INDEX: 26.68 KG/M2 | SYSTOLIC BLOOD PRESSURE: 95 MMHG

## 2022-10-11 PROCEDURE — 99213 OFFICE O/P EST LOW 20 MIN: CPT

## 2022-10-11 NOTE — ASSESSMENT
[FreeTextEntry1] : 1. Depression\par PHQ9=2\par Start lexapro 5mg qd\par Will look for therapist/counseling center\par Return 1 month\par

## 2022-10-11 NOTE — REVIEW OF SYSTEMS
[Depression] : depression [Fever] : no fever [Chills] : no chills [Night Sweats] : no night sweats [Suicidal] : not suicidal

## 2022-10-11 NOTE — HISTORY OF PRESENT ILLNESS
[FreeTextEntry1] : depression [de-identified] : Darby presents today for follow-up.  She is here to discuss depression.  We last spoke, she was having more depressive symptoms.  No SI or HI.  In the past, she had done therapy with good results.  She is currently looking for a therapist that she will develop a good rapport with.  She is interested in starting pharmacotherapy.  She is still working out in utilizing healthy coping mechanisms.  She was previously prescribed Lexapro but did not take it consistently.  No other acute concerns at this time.\par \par Kidney sonogram showed no abnormalities.  She will follow-up with OB/GYN regarding the fibroids

## 2022-11-08 ENCOUNTER — NON-APPOINTMENT (OUTPATIENT)
Age: 42
End: 2022-11-08

## 2022-11-08 ENCOUNTER — APPOINTMENT (OUTPATIENT)
Dept: INTERNAL MEDICINE | Facility: CLINIC | Age: 42
End: 2022-11-08

## 2022-11-08 VITALS
HEIGHT: 66.5 IN | BODY MASS INDEX: 26.2 KG/M2 | OXYGEN SATURATION: 99 % | SYSTOLIC BLOOD PRESSURE: 97 MMHG | DIASTOLIC BLOOD PRESSURE: 66 MMHG | HEART RATE: 48 BPM | WEIGHT: 165 LBS | RESPIRATION RATE: 12 BRPM

## 2022-11-08 PROCEDURE — 99213 OFFICE O/P EST LOW 20 MIN: CPT | Mod: 25

## 2022-11-08 PROCEDURE — 93000 ELECTROCARDIOGRAM COMPLETE: CPT | Mod: 59

## 2022-11-09 NOTE — HISTORY OF PRESENT ILLNESS
[FreeTextEntry1] : PTSD/depression [de-identified] : Darby presents today for follow up. At last visit, she was started on lexapro 5mg qd. She is tolerating well. She was worried about weight gain but weight has been stable and she has been continuing to exercise. No adverse effects. She feels biggest improvement in her motivation and has found herself being able to accomplish typical tasks like cleaning her closet. Still feel some lability with periods of feeling down. No SI. HI.

## 2022-11-09 NOTE — REVIEW OF SYSTEMS
[Fever] : no fever [Chills] : no chills [Night Sweats] : no night sweats [Recent Change In Weight] : ~T no recent weight change

## 2022-11-09 NOTE — ASSESSMENT
[FreeTextEntry1] : 1. PTSD/depression\par Increase lexapro 10mg qd\par EKG done today 
This document is complete and the patient is ready for discharge.

## 2022-12-01 ENCOUNTER — NON-APPOINTMENT (OUTPATIENT)
Age: 42
End: 2022-12-01

## 2022-12-01 ENCOUNTER — APPOINTMENT (OUTPATIENT)
Dept: INTERNAL MEDICINE | Facility: CLINIC | Age: 42
End: 2022-12-01

## 2022-12-01 VITALS
SYSTOLIC BLOOD PRESSURE: 94 MMHG | DIASTOLIC BLOOD PRESSURE: 68 MMHG | HEART RATE: 50 BPM | OXYGEN SATURATION: 99 % | WEIGHT: 164 LBS | BODY MASS INDEX: 26.07 KG/M2

## 2022-12-01 DIAGNOSIS — F43.10 POST-TRAUMATIC STRESS DISORDER, UNSPECIFIED: ICD-10-CM

## 2022-12-01 PROCEDURE — 99213 OFFICE O/P EST LOW 20 MIN: CPT | Mod: 25

## 2022-12-01 PROCEDURE — 93000 ELECTROCARDIOGRAM COMPLETE: CPT | Mod: 59

## 2022-12-01 NOTE — REVIEW OF SYSTEMS
[Fever] : no fever [Chills] : no chills [Fatigue] : no fatigue [Night Sweats] : no night sweats [Vision Problems] : no vision problems [Nasal Discharge] : no nasal discharge [Sore Throat] : no sore throat [Chest Pain] : no chest pain [Palpitations] : no palpitations [Shortness Of Breath] : no shortness of breath [Wheezing] : no wheezing [Cough] : no cough [Abdominal Pain] : no abdominal pain [Headache] : no headache [Dizziness] : no dizziness

## 2022-12-01 NOTE — HISTORY OF PRESENT ILLNESS
[FreeTextEntry1] : anxiety [de-identified] : Presents for follow-up.  She already notices some improvement on Lexapro 10 mg.  No chest pain, palpitations, shortness of breath, or dizziness/lightheadedness.  Had some mild cough with phlegm but no other upper respiratory symptoms.

## 2022-12-01 NOTE — PHYSICAL EXAM
[No Acute Distress] : no acute distress [Well Nourished] : well nourished [Well Developed] : well developed [Well-Appearing] : well-appearing [Normal Sclera/Conjunctiva] : normal sclera/conjunctiva [Normal Outer Ear/Nose] : the outer ears and nose were normal in appearance [Normal Oropharynx] : the oropharynx was normal [Normal TMs] : both tympanic membranes were normal [Normal Nasal Mucosa] : the nasal mucosa was normal [No Lymphadenopathy] : no lymphadenopathy [No Respiratory Distress] : no respiratory distress  [No Accessory Muscle Use] : no accessory muscle use [Clear to Auscultation] : lungs were clear to auscultation bilaterally [Normal Rate] : normal rate  [Regular Rhythm] : with a regular rhythm [Normal S1, S2] : normal S1 and S2 [No Murmur] : no murmur heard [No Edema] : there was no peripheral edema

## 2022-12-01 NOTE — ASSESSMENT
[FreeTextEntry1] : 1.  Depression\par Well controlled on Lexapro 10 mg\par Repeat EKG with acceptable QTC, bradycardia is chronic and unchanged\par \par 2.  Mild cough\par Exam is within normal limits.  She otherwise feels well.  If symptoms progress, she will return for reassessment

## 2023-01-07 ENCOUNTER — APPOINTMENT (OUTPATIENT)
Dept: ULTRASOUND IMAGING | Facility: CLINIC | Age: 43
End: 2023-01-07
Payer: COMMERCIAL

## 2023-01-07 PROCEDURE — 76830 TRANSVAGINAL US NON-OB: CPT

## 2023-03-06 ENCOUNTER — RX RENEWAL (OUTPATIENT)
Age: 43
End: 2023-03-06

## 2023-04-10 ENCOUNTER — APPOINTMENT (OUTPATIENT)
Dept: INTERNAL MEDICINE | Facility: CLINIC | Age: 43
End: 2023-04-10
Payer: COMMERCIAL

## 2023-04-10 VITALS
HEIGHT: 66 IN | DIASTOLIC BLOOD PRESSURE: 66 MMHG | BODY MASS INDEX: 28.28 KG/M2 | OXYGEN SATURATION: 94 % | WEIGHT: 176 LBS | HEART RATE: 54 BPM | SYSTOLIC BLOOD PRESSURE: 102 MMHG

## 2023-04-10 DIAGNOSIS — Z80.3 FAMILY HISTORY OF MALIGNANT NEOPLASM OF BREAST: ICD-10-CM

## 2023-04-10 DIAGNOSIS — N60.12 DIFFUSE CYSTIC MASTOPATHY OF RIGHT BREAST: ICD-10-CM

## 2023-04-10 DIAGNOSIS — N60.11 DIFFUSE CYSTIC MASTOPATHY OF RIGHT BREAST: ICD-10-CM

## 2023-04-10 DIAGNOSIS — E78.5 HYPERLIPIDEMIA, UNSPECIFIED: ICD-10-CM

## 2023-04-10 DIAGNOSIS — R00.1 BRADYCARDIA, UNSPECIFIED: ICD-10-CM

## 2023-04-10 PROCEDURE — 36415 COLL VENOUS BLD VENIPUNCTURE: CPT

## 2023-04-10 PROCEDURE — G0444 DEPRESSION SCREEN ANNUAL: CPT | Mod: 59

## 2023-04-10 PROCEDURE — 99213 OFFICE O/P EST LOW 20 MIN: CPT | Mod: 25

## 2023-04-10 PROCEDURE — 99396 PREV VISIT EST AGE 40-64: CPT | Mod: 25

## 2023-04-10 PROCEDURE — 93000 ELECTROCARDIOGRAM COMPLETE: CPT | Mod: 59

## 2023-04-10 NOTE — ASSESSMENT
[FreeTextEntry1] : CPE\par -Check CBC, CMP, A1c, lipid, UA\par EKG is sinus rhythm and unchanged from prior\par Rx for mammo/sono\par Up-to-date with Pap and colonoscopy\par \par 1.  Vitamin D deficiency\par Check vitamin D level\par \par 2.  Depression\par Continue Lexapro 10 mg daily\par \par 3.  Fibroids\par Followed by GYN\par

## 2023-04-10 NOTE — PHYSICAL EXAM
[No Acute Distress] : no acute distress [Well Nourished] : well nourished [Well Developed] : well developed [Well-Appearing] : well-appearing [Normal Sclera/Conjunctiva] : normal sclera/conjunctiva [No Lymphadenopathy] : no lymphadenopathy [Thyroid Normal, No Nodules] : the thyroid was normal and there were no nodules present [No Respiratory Distress] : no respiratory distress  [No Accessory Muscle Use] : no accessory muscle use [Clear to Auscultation] : lungs were clear to auscultation bilaterally [Normal Rate] : normal rate  [Regular Rhythm] : with a regular rhythm [Normal S1, S2] : normal S1 and S2 [No Murmur] : no murmur heard [No Carotid Bruits] : no carotid bruits [No Abdominal Bruit] : a ~M bruit was not heard ~T in the abdomen [No Edema] : there was no peripheral edema [Normal Appearance] : normal in appearance [No Masses] : no palpable masses [No Axillary Lymphadenopathy] : no axillary lymphadenopathy [Soft] : abdomen soft [Non Tender] : non-tender [Non-distended] : non-distended [Normal Bowel Sounds] : normal bowel sounds [Normal Supraclavicular Nodes] : no supraclavicular lymphadenopathy [Normal Axillary Nodes] : no axillary lymphadenopathy [Normal Posterior Cervical Nodes] : no posterior cervical lymphadenopathy [Normal Anterior Cervical Nodes] : no anterior cervical lymphadenopathy [No CVA Tenderness] : no CVA  tenderness [No Spinal Tenderness] : no spinal tenderness [Normal Gait] : normal gait [Alert and Oriented x3] : oriented to person, place, and time [de-identified] : Fibroid palpated in lower central abdomen

## 2023-04-10 NOTE — HISTORY OF PRESENT ILLNESS
[FreeTextEntry1] : CPE, depression [de-identified] : Darby presents today for CPE.  She has felt well.  She is on Lexapro 10 mg daily with good control of her symptoms.  She also discussed with GYN regarding large fibroid.  Hysterectomy was recommended, though she does not feel ready to pursue this yet.  No other acute concerns.

## 2023-04-10 NOTE — HEALTH RISK ASSESSMENT
[Yes] : Yes [0] : 2) Feeling down, depressed, or hopeless: Not at all (0) [PHQ-2 Negative - No further assessment needed] : PHQ-2 Negative - No further assessment needed [[id number]] : 0 [Patient reported mammogram was normal] : Patient reported mammogram was normal [Patient reported PAP Smear was normal] : Patient reported PAP Smear was normal [MammogramComments] : Rx provided for mammo and sono [PapSmearComments] : Followed by GYN [ColonoscopyComments] : Last colonoscopy last year.  Will obtain records from  [Former] : Former

## 2023-04-10 NOTE — REVIEW OF SYSTEMS
[Fever] : no fever [Chills] : no chills [Night Sweats] : no night sweats [Vision Problems] : no vision problems [Nasal Discharge] : no nasal discharge [Sore Throat] : no sore throat [Chest Pain] : no chest pain [Palpitations] : no palpitations [Shortness Of Breath] : no shortness of breath [Wheezing] : no wheezing [Cough] : no cough [Abdominal Pain] : no abdominal pain [Nausea] : no nausea [Constipation] : no constipation [Diarrhea] : diarrhea [Vomiting] : no vomiting [Dysuria] : no dysuria [Hematuria] : no hematuria [Joint Pain] : no joint pain [Muscle Pain] : no muscle pain [Headache] : no headache [Dizziness] : no dizziness [Anxiety] : no anxiety [Depression] : no depression

## 2023-04-24 LAB
25(OH)D3 SERPL-MCNC: 23.6 NG/ML
ALBUMIN SERPL ELPH-MCNC: 4.5 G/DL
ALP BLD-CCNC: 58 U/L
ALT SERPL-CCNC: 18 U/L
ANION GAP SERPL CALC-SCNC: 15 MMOL/L
APPEARANCE: CLEAR
AST SERPL-CCNC: 16 U/L
BACTERIA: NEGATIVE
BASOPHILS # BLD AUTO: 0.04 K/UL
BASOPHILS NFR BLD AUTO: 0.7 %
BILIRUB SERPL-MCNC: 0.4 MG/DL
BILIRUBIN URINE: NEGATIVE
BLOOD URINE: NEGATIVE
BUN SERPL-MCNC: 11 MG/DL
CALCIUM SERPL-MCNC: 9.5 MG/DL
CHLORIDE SERPL-SCNC: 98 MMOL/L
CHOLEST SERPL-MCNC: 244 MG/DL
CO2 SERPL-SCNC: 23 MMOL/L
COLOR: COLORLESS
CREAT SERPL-MCNC: 0.78 MG/DL
EGFR: 97 ML/MIN/1.73M2
EOSINOPHIL # BLD AUTO: 0.04 K/UL
EOSINOPHIL NFR BLD AUTO: 0.7 %
ESTIMATED AVERAGE GLUCOSE: 114 MG/DL
GLUCOSE QUALITATIVE U: NEGATIVE
GLUCOSE SERPL-MCNC: 84 MG/DL
HBA1C MFR BLD HPLC: 5.6 %
HCT VFR BLD CALC: 40.6 %
HDLC SERPL-MCNC: 83 MG/DL
HGB BLD-MCNC: 13.3 G/DL
HYALINE CASTS: 1 /LPF
IMM GRANULOCYTES NFR BLD AUTO: 0.2 %
KETONES URINE: NEGATIVE
LDLC SERPL CALC-MCNC: 148 MG/DL
LEUKOCYTE ESTERASE URINE: NEGATIVE
LYMPHOCYTES # BLD AUTO: 1.83 K/UL
LYMPHOCYTES NFR BLD AUTO: 31.1 %
MAN DIFF?: NORMAL
MCHC RBC-ENTMCNC: 30.4 PG
MCHC RBC-ENTMCNC: 32.8 GM/DL
MCV RBC AUTO: 92.7 FL
MICROSCOPIC-UA: NORMAL
MONOCYTES # BLD AUTO: 0.3 K/UL
MONOCYTES NFR BLD AUTO: 5.1 %
NEUTROPHILS # BLD AUTO: 3.67 K/UL
NEUTROPHILS NFR BLD AUTO: 62.2 %
NITRITE URINE: NEGATIVE
NONHDLC SERPL-MCNC: 161 MG/DL
PH URINE: 6.5
PLATELET # BLD AUTO: 263 K/UL
POTASSIUM SERPL-SCNC: 3.9 MMOL/L
PROT SERPL-MCNC: 7 G/DL
PROTEIN URINE: NEGATIVE
RBC # BLD: 4.38 M/UL
RBC # FLD: 14 %
RED BLOOD CELLS URINE: 0 /HPF
SODIUM SERPL-SCNC: 137 MMOL/L
SPECIFIC GRAVITY URINE: 1.01
SQUAMOUS EPITHELIAL CELLS: 4 /HPF
TRIGL SERPL-MCNC: 65 MG/DL
UROBILINOGEN URINE: NORMAL
WBC # FLD AUTO: 5.89 K/UL
WHITE BLOOD CELLS URINE: 1 /HPF

## 2023-04-24 RX ORDER — CHOLECALCIFEROL (VITAMIN D3) 50 MCG
50 MCG TABLET ORAL
Qty: 90 | Refills: 1 | Status: ACTIVE | COMMUNITY
Start: 2023-04-24 | End: 1900-01-01

## 2023-04-24 RX ORDER — ERGOCALCIFEROL 1.25 MG/1
1.25 MG CAPSULE, LIQUID FILLED ORAL
Qty: 6 | Refills: 0 | Status: ACTIVE | COMMUNITY
Start: 2021-02-19 | End: 1900-01-01

## 2023-06-03 ENCOUNTER — RX RENEWAL (OUTPATIENT)
Age: 43
End: 2023-06-03

## 2023-07-14 RX ORDER — ALPRAZOLAM 0.25 MG/1
0.25 TABLET ORAL
Qty: 14 | Refills: 0 | Status: ACTIVE | COMMUNITY
Start: 2023-07-14 | End: 1900-01-01

## 2023-08-05 ENCOUNTER — APPOINTMENT (OUTPATIENT)
Dept: MRI IMAGING | Facility: CLINIC | Age: 43
End: 2023-08-05
Payer: COMMERCIAL

## 2023-08-05 PROCEDURE — 72197 MRI PELVIS W/O & W/DYE: CPT

## 2023-08-05 PROCEDURE — A9585: CPT

## 2023-08-20 ENCOUNTER — NON-APPOINTMENT (OUTPATIENT)
Age: 43
End: 2023-08-20

## 2023-10-05 NOTE — PHYSICAL EXAM
Sepsis Criteria were met: [No Acute Distress] : no acute distress [Well Nourished] : well nourished [Well Developed] : well developed [Well-Appearing] : well-appearing [Normal Voice/Communication] : normal voice/communication [Normal Sclera/Conjunctiva] : normal sclera/conjunctiva [PERRL] : pupils equal round and reactive to light [EOMI] : extraocular movements intact [Normal Outer Ear/Nose] : the outer ears and nose were normal in appearance [Normal Oropharynx] : the oropharynx was normal [Normal TMs] : both tympanic membranes were normal [No JVD] : no jugular venous distention [Supple] : supple [No Lymphadenopathy] : no lymphadenopathy [Thyroid Normal, No Nodules] : the thyroid was normal and there were no nodules present [No Respiratory Distress] : no respiratory distress  [Clear to Auscultation] : lungs were clear to auscultation bilaterally [No Accessory Muscle Use] : no accessory muscle use [Normal Percussion] : the chest was normal to percussion [Normal Rate] : normal rate  [Regular Rhythm] : with a regular rhythm [Normal S1, S2] : normal S1 and S2 [No Murmur] : no murmur heard [No Carotid Bruits] : no carotid bruits [No Abdominal Bruit] : a ~M bruit was not heard ~T in the abdomen [No Varicosities] : no varicosities [Pedal Pulses Present] : the pedal pulses are present [No Edema] : there was no peripheral edema [No Extremity Clubbing/Cyanosis] : no extremity clubbing/cyanosis [No Palpable Aorta] : no palpable aorta [No Nipple Discharge] : no nipple discharge [No Axillary Lymphadenopathy] : no axillary lymphadenopathy [Soft] : abdomen soft [Non Tender] : non-tender [Non-distended] : non-distended [No Masses] : no abdominal mass palpated [No HSM] : no HSM [Normal Bowel Sounds] : normal bowel sounds [Normal Supraclavicular Nodes] : no supraclavicular lymphadenopathy [Normal Axillary Nodes] : no axillary lymphadenopathy [Normal Posterior Cervical Nodes] : no posterior cervical lymphadenopathy [Normal Anterior Cervical Nodes] : no anterior cervical lymphadenopathy [Normal Inguinal Nodes] : no inguinal lymphadenopathy [No CVA Tenderness] : no CVA  tenderness [No Spinal Tenderness] : no spinal tenderness [No Joint Swelling] : no joint swelling [Grossly Normal Strength/Tone] : grossly normal strength/tone [No Rash] : no rash [Normal Gait] : normal gait [Coordination Grossly Intact] : coordination grossly intact [No Focal Deficits] : no focal deficits [Deep Tendon Reflexes (DTR)] : deep tendon reflexes were 2+ and symmetric [Speech Grossly Normal] : speech grossly normal [Memory Grossly Normal] : memory grossly normal [Normal Affect] : the affect was normal [Alert and Oriented x3] : oriented to person, place, and time [Normal Mood] : the mood was normal [Normal Insight/Judgement] : insight and judgment were intact [Kyphosis] : no kyphosis [Scoliosis] : no scoliosis [de-identified] : pendulous [de-identified] : Seborrheic keratoses left lateral thigh and right flank

## 2023-10-10 ENCOUNTER — APPOINTMENT (OUTPATIENT)
Dept: INTERNAL MEDICINE | Facility: CLINIC | Age: 43
End: 2023-10-10
Payer: COMMERCIAL

## 2023-10-10 VITALS
OXYGEN SATURATION: 97 % | WEIGHT: 175 LBS | SYSTOLIC BLOOD PRESSURE: 105 MMHG | DIASTOLIC BLOOD PRESSURE: 72 MMHG | HEART RATE: 58 BPM | BODY MASS INDEX: 28.25 KG/M2

## 2023-10-10 PROCEDURE — 99213 OFFICE O/P EST LOW 20 MIN: CPT

## 2023-10-26 NOTE — H&P PST ADULT - DOCUMENT STATUS
H&P reviewed. After examining the patient I find no changes in the patients condition since the H&P had been written.     Vitals:    10/26/23 1159   BP: 158/73   Pulse: 89   Resp: 18   Temp: 97.9 °F (36.6 °C)   SpO2: 98% Authored by Resident/PA/NP

## 2023-10-29 NOTE — ASU PATIENT PROFILE, ADULT - NS SC CAGE ALCOHOL CUT DOWN
BIBA found in street screaming. pt agitated and yelling in triage. MD An at bedside IM ketamine ordered and administered. PT placed on 2L NC and CM NSR Noted no

## 2023-11-21 ENCOUNTER — RX RENEWAL (OUTPATIENT)
Age: 43
End: 2023-11-21

## 2023-12-11 ENCOUNTER — APPOINTMENT (OUTPATIENT)
Dept: OBGYN | Facility: CLINIC | Age: 43
End: 2023-12-11
Payer: COMMERCIAL

## 2023-12-11 VITALS
BODY MASS INDEX: 28.61 KG/M2 | WEIGHT: 178 LBS | SYSTOLIC BLOOD PRESSURE: 100 MMHG | DIASTOLIC BLOOD PRESSURE: 68 MMHG | HEIGHT: 66 IN | HEART RATE: 62 BPM

## 2023-12-11 DIAGNOSIS — R10.2 PELVIC AND PERINEAL PAIN: ICD-10-CM

## 2023-12-11 DIAGNOSIS — Z23 ENCOUNTER FOR IMMUNIZATION: ICD-10-CM

## 2023-12-11 DIAGNOSIS — Z13.31 ENCOUNTER FOR SCREENING FOR DEPRESSION: ICD-10-CM

## 2023-12-11 DIAGNOSIS — Z12.39 ENCOUNTER FOR OTHER SCREENING FOR MALIGNANT NEOPLASM OF BREAST: ICD-10-CM

## 2023-12-11 PROCEDURE — 99386 PREV VISIT NEW AGE 40-64: CPT

## 2023-12-12 LAB
BASOPHILS # BLD AUTO: 0.07 K/UL
BASOPHILS NFR BLD AUTO: 0.5 %
EOSINOPHIL # BLD AUTO: 0.41 K/UL
EOSINOPHIL NFR BLD AUTO: 3.2 %
HCT VFR BLD CALC: 37.6 %
HGB BLD-MCNC: 12.3 G/DL
HPV HIGH+LOW RISK DNA PNL CVX: NOT DETECTED
IMM GRANULOCYTES NFR BLD AUTO: 0.5 %
LYMPHOCYTES # BLD AUTO: 2.82 K/UL
LYMPHOCYTES NFR BLD AUTO: 21.9 %
MAN DIFF?: NORMAL
MCHC RBC-ENTMCNC: 30.3 PG
MCHC RBC-ENTMCNC: 32.7 GM/DL
MCV RBC AUTO: 92.6 FL
MONOCYTES # BLD AUTO: 0.92 K/UL
MONOCYTES NFR BLD AUTO: 7.1 %
NEUTROPHILS # BLD AUTO: 8.61 K/UL
NEUTROPHILS NFR BLD AUTO: 66.8 %
PLATELET # BLD AUTO: 293 K/UL
RBC # BLD: 4.06 M/UL
RBC # FLD: 15.2 %
WBC # FLD AUTO: 12.89 K/UL

## 2023-12-14 DIAGNOSIS — N76.0 ACUTE VAGINITIS: ICD-10-CM

## 2023-12-14 LAB — CYTOLOGY CVX/VAG DOC THIN PREP: ABNORMAL

## 2024-01-25 ENCOUNTER — OUTPATIENT (OUTPATIENT)
Dept: OUTPATIENT SERVICES | Facility: HOSPITAL | Age: 44
LOS: 1 days | End: 2024-01-25
Payer: COMMERCIAL

## 2024-01-25 VITALS
OXYGEN SATURATION: 98 % | HEIGHT: 66 IN | RESPIRATION RATE: 18 BRPM | SYSTOLIC BLOOD PRESSURE: 115 MMHG | DIASTOLIC BLOOD PRESSURE: 70 MMHG | HEART RATE: 62 BPM | WEIGHT: 167.55 LBS | TEMPERATURE: 97 F

## 2024-01-25 DIAGNOSIS — Z98.89 OTHER SPECIFIED POSTPROCEDURAL STATES: Chronic | ICD-10-CM

## 2024-01-25 DIAGNOSIS — Z01.818 ENCOUNTER FOR OTHER PREPROCEDURAL EXAMINATION: ICD-10-CM

## 2024-01-25 DIAGNOSIS — Z29.9 ENCOUNTER FOR PROPHYLACTIC MEASURES, UNSPECIFIED: ICD-10-CM

## 2024-01-25 DIAGNOSIS — D25.9 LEIOMYOMA OF UTERUS, UNSPECIFIED: ICD-10-CM

## 2024-01-25 DIAGNOSIS — Z98.890 OTHER SPECIFIED POSTPROCEDURAL STATES: Chronic | ICD-10-CM

## 2024-01-25 LAB
A1C WITH ESTIMATED AVERAGE GLUCOSE RESULT: 5.4 % — SIGNIFICANT CHANGE UP (ref 4–5.6)
ANION GAP SERPL CALC-SCNC: 10 MMOL/L — SIGNIFICANT CHANGE UP (ref 5–17)
APTT BLD: 32.9 SEC — SIGNIFICANT CHANGE UP (ref 24.5–35.6)
BASOPHILS # BLD AUTO: 0.04 K/UL — SIGNIFICANT CHANGE UP (ref 0–0.2)
BASOPHILS NFR BLD AUTO: 0.9 % — SIGNIFICANT CHANGE UP (ref 0–2)
BLD GP AB SCN SERPL QL: SIGNIFICANT CHANGE UP
BUN SERPL-MCNC: 9.4 MG/DL — SIGNIFICANT CHANGE UP (ref 8–20)
CALCIUM SERPL-MCNC: 9 MG/DL — SIGNIFICANT CHANGE UP (ref 8.4–10.5)
CHLORIDE SERPL-SCNC: 101 MMOL/L — SIGNIFICANT CHANGE UP (ref 96–108)
CO2 SERPL-SCNC: 27 MMOL/L — SIGNIFICANT CHANGE UP (ref 22–29)
CREAT SERPL-MCNC: 0.69 MG/DL — SIGNIFICANT CHANGE UP (ref 0.5–1.3)
EGFR: 110 ML/MIN/1.73M2 — SIGNIFICANT CHANGE UP
EOSINOPHIL # BLD AUTO: 0.18 K/UL — SIGNIFICANT CHANGE UP (ref 0–0.5)
EOSINOPHIL NFR BLD AUTO: 4.3 % — SIGNIFICANT CHANGE UP (ref 0–6)
ESTIMATED AVERAGE GLUCOSE: 108 MG/DL — SIGNIFICANT CHANGE UP (ref 68–114)
GLUCOSE SERPL-MCNC: 91 MG/DL — SIGNIFICANT CHANGE UP (ref 70–99)
HCG SERPL-ACNC: <4 MIU/ML — SIGNIFICANT CHANGE UP
HCT VFR BLD CALC: 42.6 % — SIGNIFICANT CHANGE UP (ref 34.5–45)
HGB BLD-MCNC: 14.5 G/DL — SIGNIFICANT CHANGE UP (ref 11.5–15.5)
IMM GRANULOCYTES NFR BLD AUTO: 0.2 % — SIGNIFICANT CHANGE UP (ref 0–0.9)
INR BLD: 0.94 RATIO — SIGNIFICANT CHANGE UP (ref 0.85–1.18)
LYMPHOCYTES # BLD AUTO: 1.45 K/UL — SIGNIFICANT CHANGE UP (ref 1–3.3)
LYMPHOCYTES # BLD AUTO: 34.3 % — SIGNIFICANT CHANGE UP (ref 13–44)
MCHC RBC-ENTMCNC: 31.3 PG — SIGNIFICANT CHANGE UP (ref 27–34)
MCHC RBC-ENTMCNC: 34 GM/DL — SIGNIFICANT CHANGE UP (ref 32–36)
MCV RBC AUTO: 92 FL — SIGNIFICANT CHANGE UP (ref 80–100)
MONOCYTES # BLD AUTO: 0.34 K/UL — SIGNIFICANT CHANGE UP (ref 0–0.9)
MONOCYTES NFR BLD AUTO: 8 % — SIGNIFICANT CHANGE UP (ref 2–14)
NEUTROPHILS # BLD AUTO: 2.21 K/UL — SIGNIFICANT CHANGE UP (ref 1.8–7.4)
NEUTROPHILS NFR BLD AUTO: 52.3 % — SIGNIFICANT CHANGE UP (ref 43–77)
PLATELET # BLD AUTO: 210 K/UL — SIGNIFICANT CHANGE UP (ref 150–400)
POTASSIUM SERPL-MCNC: 4.1 MMOL/L — SIGNIFICANT CHANGE UP (ref 3.5–5.3)
POTASSIUM SERPL-SCNC: 4.1 MMOL/L — SIGNIFICANT CHANGE UP (ref 3.5–5.3)
PROTHROM AB SERPL-ACNC: 10.4 SEC — SIGNIFICANT CHANGE UP (ref 9.5–13)
RBC # BLD: 4.63 M/UL — SIGNIFICANT CHANGE UP (ref 3.8–5.2)
RBC # FLD: 13.1 % — SIGNIFICANT CHANGE UP (ref 10.3–14.5)
SODIUM SERPL-SCNC: 138 MMOL/L — SIGNIFICANT CHANGE UP (ref 135–145)
WBC # BLD: 4.23 K/UL — SIGNIFICANT CHANGE UP (ref 3.8–10.5)
WBC # FLD AUTO: 4.23 K/UL — SIGNIFICANT CHANGE UP (ref 3.8–10.5)

## 2024-01-25 PROCEDURE — G0463: CPT

## 2024-01-25 RX ORDER — ESCITALOPRAM OXALATE 10 MG/1
1.5 TABLET, FILM COATED ORAL
Refills: 0 | DISCHARGE

## 2024-01-25 NOTE — H&P PST ADULT - ATTENDING COMMENTS
Patient and chart reviewed  Procedure discussed and consent obtained   Questions answered and instructions given  Fibroid Uterus ,, Menorrhagia,, desires fertility   Myomectomy ,,,, possible hysterectomy

## 2024-01-25 NOTE — H&P PST ADULT - NSICDXPASTSURGICALHX_GEN_ALL_CORE_FT
PAST SURGICAL HISTORY:  S/P laminectomy PAST SURGICAL HISTORY:  H/O bilateral breast reduction surgery     S/P laminectomy

## 2024-01-25 NOTE — H&P PST ADULT - NSICDXFAMILYHX_GEN_ALL_CORE_FT
FAMILY HISTORY:  FHx: breast cancer, materna grandmother FAMILY HISTORY:  FHx: breast cancer, materna grandmother

## 2024-01-25 NOTE — H&P PST ADULT - MUSCULOSKELETAL
negative normal/ROM intact/no joint swelling/no joint erythema/no calf tenderness/normal gait/strength 5/5 bilateral upper extremities/strength 5/5 bilateral lower extremities

## 2024-01-25 NOTE — H&P PST ADULT - NSICDXPASTMEDICALHX_GEN_ALL_CORE_FT
PAST MEDICAL HISTORY:  Anxiety     Breast hypertrophy PAST MEDICAL HISTORY:  Anxiety and depression     Breast hypertrophy     Lumbar herniated disc L5S1    Uterine leiomyoma

## 2024-01-25 NOTE — H&P PST ADULT - ASSESSMENT
CAPRINI SCORE    AGE RELATED RISK FACTORS                                                             [ ] Age 41-60 years                                            (1 Point)  [ ] Age: 61-74 years                                           (2 Points)                 [ ] Age= 75 years                                                (3 Points)             DISEASE RELATED RISK FACTORS                                                       [ ] Edema in the lower extremities                 (1 Point)                     [ ] Varicose veins                                               (1 Point)                                 [ ] BMI > 25 Kg/m2                                            (1 Point)                                  [ ] Serious infection (ie PNA)                            (1 Point)                     [ ] Lung disease ( COPD, Emphysema)            (1 Point)                                                                          [ ] Acute myocardial infarction                         (1 Point)                  [ ] Congestive heart failure (in the previous month)  (1 Point)         [ ] Inflammatory bowel disease                            (1 Point)                  [ ] Central venous access, PICC or Port               (2 points)       (within the last month)                                                                [ ] Stroke (in the previous month)                        (5 Points)    [ ] Previous or present malignancy                       (2 points)                                                                                                                                                         HEMATOLOGY RELATED FACTORS                                                         [ ] Prior episodes of VTE                                     (3 Points)                     [ ] Positive family history for VTE                      (3 Points)                  [ ] Prothrombin 27147 A                                     (3 Points)                     [ ] Factor V Leiden                                                (3 Points)                        [ ] Lupus anticoagulants                                      (3 Points)                                                           [ ] Anticardiolipin antibodies                              (3 Points)                                                       [ ] High homocysteine in the blood                   (3 Points)                                             [ ] Other congenital or acquired thrombophilia      (3 Points)                                                [ ] Heparin induced thrombocytopenia                  (3 Points)                                        MOBILITY RELATED FACTORS  [ ] Bed rest                                                         (1 Point)  [ ] Plaster cast                                                    (2 points)  [ ] Bed bound for more than 72 hours           (2 Points)    GENDER SPECIFIC FACTORS  [ ] Pregnancy or had a baby within the last month   (1 Point)  [ ] Post-partum < 6 weeks                                   (1 Point)  [ ] Hormonal therapy  or oral contraception   (1 Point)  [ ] History of pregnancy complications              (1 point)  [ ] Unexplained or recurrent              (1 Point)    OTHER RISK FACTORS                                           (1 Point)  [ ] BMI >40, smoking, diabetes requiring insulin, chemotherapy  blood transfusions and length of surgery over 2 hours    SURGERY RELATED RISK FACTORS  [ ]  Section within the last month     (1 Point)  [ ] Minor surgery                                                  (1 Point)  [ ] Arthroscopic surgery                                       (2 Points)  [ ] Planned major surgery lasting more            (2 Points)      than 45 minutes     [ ] Elective hip or knee joint replacement       (5 points)       surgery                                                TRAUMA RELATED RISK FACTORS  [ ] Fracture of the hip, pelvis, or leg                       (5 Points)  [ ] Spinal cord injury resulting in paralysis             (5 points)       (in the previous month)    [ ] Paralysis  (less than 1 month)                             (5 Points)  [ ] Multiple Trauma within 1 month                        (5 Points)    Total Score [        ]    Caprini Score 0-2: Low Risk, NO VTE prophylaxis required for most patients, encourage ambulation  Caprini Score 3-6: Moderate Risk , pharmacologic VTE prophylaxis is indicated for most patients (in the absence of contraindications)  Caprini Score Greater than or =7: High risk, pharmocologic VTE prophylaxis indicated for most patients (in the absence of contraindications)    OPIOID RISK TOOL    ELIS EACH BOX THAT APPLIES AND ADD TOTALS AT THE END    FAMILY HISTORY OF SUBSTANCE ABUSE                 FEMALE         MALE                                                Alcohol                             [  ]1 pt          [  ]3pts                                               Illegal Durgs                     [  ]2 pts        [  ]3pts                                               Rx Drugs                           [  ]4 pts        [  ]4 pts    PERSONAL HISTORY OF SUBSTANCE ABUSE                                                                                          Alcohol                             [  ]3 pts       [  ]3 pts                                               Illegal Durgs                     [  ]4 pts        [  ]4 pts                                               Rx Drugs                           [  ]5 pts        [  ]5 pts    AGE BETWEEN 16-45 YEARS                                      [  ]1 pt         [  ]1 pt    HISTORY OF PREADOLESCENT   SEXUAL ABUSE                                                             [  ]3 pts        [  ]0pts    PSYCHOLOGICAL DISEASE                     ADD, OCD, Bipolar, Schizophrenia        [  ]2 pts         [  ]2 pts                      Depression                                               [  ]1 pt           [  ]1 pt           SCORING TOTAL   (add numbers and type here)              (***)                                     A score of 3 or lower indicated LOW risk for future opiod abuse  A score of 4 to 7 indicated moderate risk for future opiod abuse  A score of 8 or higher indicates a high risk for opiod abuse    Patient educated on surgical scrub, ERP, preadmission instructions, medical clearance and day of procedure medications, verbalizes understanding.   Pt instructed to stop vitamins/supplements/herbal medications/ASA/NSAIDS for one week prior to surgery and discuss with PMD.                            CAPRINI SCORE    AGE RELATED RISK FACTORS                                                             [ x] Age 41-60 years                                            (1 Point)  [ ] Age: 61-74 years                                           (2 Points)                 [ ] Age= 75 years                                                (3 Points)             DISEASE RELATED RISK FACTORS                                                       [ ] Edema in the lower extremities                 (1 Point)                     [ ] Varicose veins                                               (1 Point)                                 [ x] BMI > 25 Kg/m2                                            (1 Point)                                  [ ] Serious infection (ie PNA)                            (1 Point)                     [ ] Lung disease ( COPD, Emphysema)            (1 Point)                                                                          [ ] Acute myocardial infarction                         (1 Point)                  [ ] Congestive heart failure (in the previous month)  (1 Point)         [ ] Inflammatory bowel disease                            (1 Point)                  [ ] Central venous access, PICC or Port               (2 points)       (within the last month)                                                                [ ] Stroke (in the previous month)                        (5 Points)    [ ] Previous or present malignancy                       (2 points)                                                                                                                                                         HEMATOLOGY RELATED FACTORS                                                         [ ] Prior episodes of VTE                                     (3 Points)                     [ ] Positive family history for VTE                      (3 Points)                  [ ] Prothrombin 34369 A                                     (3 Points)                     [ ] Factor V Leiden                                                (3 Points)                        [ ] Lupus anticoagulants                                      (3 Points)                                                           [ ] Anticardiolipin antibodies                              (3 Points)                                                       [ ] High homocysteine in the blood                   (3 Points)                                             [ ] Other congenital or acquired thrombophilia      (3 Points)                                                [ ] Heparin induced thrombocytopenia                  (3 Points)                                        MOBILITY RELATED FACTORS  [ ] Bed rest                                                         (1 Point)  [ ] Plaster cast                                                    (2 points)  [ ] Bed bound for more than 72 hours           (2 Points)    GENDER SPECIFIC FACTORS  [ ] Pregnancy or had a baby within the last month   (1 Point)  [ ] Post-partum < 6 weeks                                   (1 Point)  [ ] Hormonal therapy  or oral contraception   (1 Point)  [ ] History of pregnancy complications              (1 point)  [ ] Unexplained or recurrent              (1 Point)    OTHER RISK FACTORS                                           (1 Point)  [ ] BMI >40, smoking, diabetes requiring insulin, chemotherapy  blood transfusions and length of surgery over 2 hours    SURGERY RELATED RISK FACTORS  [ ]  Section within the last month     (1 Point)  [ ] Minor surgery                                                  (1 Point)  [ ] Arthroscopic surgery                                       (2 Points)  [x] Planned major surgery lasting more            (2 Points)      than 45 minutes     [ ] Elective hip or knee joint replacement       (5 points)       surgery                                                TRAUMA RELATED RISK FACTORS  [ ] Fracture of the hip, pelvis, or leg                       (5 Points)  [ ] Spinal cord injury resulting in paralysis             (5 points)       (in the previous month)    [ ] Paralysis  (less than 1 month)                             (5 Points)  [ ] Multiple Trauma within 1 month                        (5 Points)    Total Score [        ]    Caprini Score 0-2: Low Risk, NO VTE prophylaxis required for most patients, encourage ambulation  Caprini Score 3-6: Moderate Risk , pharmacologic VTE prophylaxis is indicated for most patients (in the absence of contraindications)  Caprini Score Greater than or =7: High risk, pharmocologic VTE prophylaxis indicated for most patients (in the absence of contraindications)    OPIOID RISK TOOL    ELIS EACH BOX THAT APPLIES AND ADD TOTALS AT THE END    FAMILY HISTORY OF SUBSTANCE ABUSE                 FEMALE         MALE                                                Alcohol                             [  ]1 pt          [  ]3pts                                               Illegal Durgs                     [  ]2 pts        [  ]3pts                                               Rx Drugs                           [  ]4 pts        [  ]4 pts    PERSONAL HISTORY OF SUBSTANCE ABUSE                                                                                          Alcohol                             [  ]3 pts       [  ]3 pts                                               Illegal Durgs                     [  ]4 pts        [  ]4 pts                                               Rx Drugs                           [  ]5 pts        [  ]5 pts    AGE BETWEEN 16-45 YEARS                                      [  ]1 pt         [  ]1 pt    HISTORY OF PREADOLESCENT   SEXUAL ABUSE                                                             [  ]3 pts        [  ]0pts    PSYCHOLOGICAL DISEASE                     ADD, OCD, Bipolar, Schizophrenia        [  ]2 pts         [  ]2 pts                      Depression                                               [  ]1 pt           [  ]1 pt           SCORING TOTAL   (add numbers and type here)              (***)                                     A score of 3 or lower indicated LOW risk for future opiod abuse  A score of 4 to 7 indicated moderate risk for future opiod abuse  A score of 8 or higher indicates a high risk for opiod abuse    Patient educated on surgical scrub, ERP, preadmission instructions, medical clearance and day of procedure medications, verbalizes understanding.   Pt instructed to stop vitamins/supplements/herbal medications/ASA/NSAIDS for one week prior to surgery and discuss with PMD.                            CAPRINI SCORE    AGE RELATED RISK FACTORS                                                             [ x] Age 41-60 years                                            (1 Point)  [ ] Age: 61-74 years                                           (2 Points)                 [ ] Age= 75 years                                                (3 Points)             DISEASE RELATED RISK FACTORS                                                       [ ] Edema in the lower extremities                 (1 Point)                     [ ] Varicose veins                                               (1 Point)                                 [ x] BMI > 25 Kg/m2                                            (1 Point)                                  [ ] Serious infection (ie PNA)                            (1 Point)                     [ ] Lung disease ( COPD, Emphysema)            (1 Point)                                                                          [ ] Acute myocardial infarction                         (1 Point)                  [ ] Congestive heart failure (in the previous month)  (1 Point)         [ ] Inflammatory bowel disease                            (1 Point)                  [ ] Central venous access, PICC or Port               (2 points)       (within the last month)                                                                [ ] Stroke (in the previous month)                        (5 Points)    [ ] Previous or present malignancy                       (2 points)                                                                                                                                                         HEMATOLOGY RELATED FACTORS                                                         [ ] Prior episodes of VTE                                     (3 Points)                     [ ] Positive family history for VTE                      (3 Points)                  [ ] Prothrombin 14386 A                                     (3 Points)                     [ ] Factor V Leiden                                                (3 Points)                        [ ] Lupus anticoagulants                                      (3 Points)                                                           [ ] Anticardiolipin antibodies                              (3 Points)                                                       [ ] High homocysteine in the blood                   (3 Points)                                             [ ] Other congenital or acquired thrombophilia      (3 Points)                                                [ ] Heparin induced thrombocytopenia                  (3 Points)                                        MOBILITY RELATED FACTORS  [ ] Bed rest                                                         (1 Point)  [ ] Plaster cast                                                    (2 points)  [ ] Bed bound for more than 72 hours           (2 Points)    GENDER SPECIFIC FACTORS  [ ] Pregnancy or had a baby within the last month   (1 Point)  [ ] Post-partum < 6 weeks                                   (1 Point)  [ ] Hormonal therapy  or oral contraception   (1 Point)  [ ] History of pregnancy complications              (1 point)  [ ] Unexplained or recurrent              (1 Point)    OTHER RISK FACTORS                                           (1 Point)  [ ] BMI >40, smoking, diabetes requiring insulin, chemotherapy  blood transfusions and length of surgery over 2 hours    SURGERY RELATED RISK FACTORS  [ ]  Section within the last month     (1 Point)  [ ] Minor surgery                                                  (1 Point)  [ ] Arthroscopic surgery                                       (2 Points)  [x] Planned major surgery lasting more            (2 Points)      than 45 minutes     [ ] Elective hip or knee joint replacement       (5 points)       surgery                                                TRAUMA RELATED RISK FACTORS  [ ] Fracture of the hip, pelvis, or leg                       (5 Points)  [ ] Spinal cord injury resulting in paralysis             (5 points)       (in the previous month)    [ ] Paralysis  (less than 1 month)                             (5 Points)  [ ] Multiple Trauma within 1 month                        (5 Points)    Total Score [   4     ]    Caprini Score 0-2: Low Risk, NO VTE prophylaxis required for most patients, encourage ambulation  Caprini Score 3-6: Moderate Risk , pharmacologic VTE prophylaxis is indicated for most patients (in the absence of contraindications)  Caprini Score Greater than or =7: High risk, pharmocologic VTE prophylaxis indicated for most patients (in the absence of contraindications)    OPIOID RISK TOOL    ELIS EACH BOX THAT APPLIES AND ADD TOTALS AT THE END    FAMILY HISTORY OF SUBSTANCE ABUSE                 FEMALE         MALE                                                Alcohol                             [  ]1 pt          [  ]3pts                                               Illegal Durgs                     [  ]2 pts        [  ]3pts                                               Rx Drugs                           [  ]4 pts        [  ]4 pts    PERSONAL HISTORY OF SUBSTANCE ABUSE                                                                                          Alcohol                             [  ]3 pts       [  ]3 pts                                               Illegal Durgs                     [  ]4 pts        [  ]4 pts                                               Rx Drugs                           [  ]5 pts        [  ]5 pts    AGE BETWEEN 16-45 YEARS                                      [ x ]1 pt         [  ]1 pt    HISTORY OF PREADOLESCENT   SEXUAL ABUSE                                                             [  ]3 pts        [  ]0pts    PSYCHOLOGICAL DISEASE                     ADD, OCD, Bipolar, Schizophrenia        [  ]2 pts         [  ]2 pts                      Depression                                               [ x ]1 pt           [  ]1 pt           SCORING TOTAL  2                                   A score of 3 or lower indicated LOW risk for future opiod abuse  A score of 4 to 7 indicated moderate risk for future opiod abuse  A score of 8 or higher indicates a high risk for opiod abuse    43 year old female , pmhx of depression, breast reduction , laminectomy , former smoker and uterine leiomyoma. LMP 23, menstrual period occurs 2x a month, menorrhagia and dysmenorrhea, >1 year of symptoms. MRI from 2023 uterus measuring 10.5 x 5.0 x 8.0 with a large fundal ventricular fibroid measuring 9.8 x 7.6 x 10.5. Patient reports recent URI with AM cough, but denies fever chills, SOB, wheezing. Patient is scheduled for abdominal myomectomy on 24 with Dr Romero.   Patient educated on surgical scrub, ERP, preadmission instructions, medical clearance and day of procedure medications, verbalizes understanding.   Pt instructed to stop vitamins/supplements/herbal medications/ASA/NSAIDS for one week prior to surgery and discuss with PMD.

## 2024-01-25 NOTE — H&P PST ADULT - HISTORY OF PRESENT ILLNESS
Patient presents for initial GYN visit and evaluation with history of heavy and painful periods and also fibroid uterus  Physical exam reveals a well-developed well-nourished female no apparent distress, BMI 28  Heart regular rhythm and rate, lungs clear, breast no mass nontender no skin change no nipple discharge no adenopathy, abdomen soft nontender no organomegaly  Evidence of breast skin scarring from history of breast reduction surgery  Pelvic exam shows normal female external genitalia, vagina no lesions, cervix appropriate size, uterus anteverted bulky with enlarged fibroid at the fundus of the uterus, adnexa no mass nontender  Pap was performed  Patient had a recent mammogram performed in August of this year with negative findings and we will forward over the results  Patient does provide a copy of her MRI from August of this year that shows uterus measuring 10.5 x 5.0 x 8.0 with a large fundal ventricular fibroid measuring 9.8 x 7.6 x 10.5  Findings discussed extensively with patient and her mother  Patient desires future fertility  Patient is amendable to myomectomy for uterine preservation and discussed extensively possibility of excessive bleeding that may require life-saving hysterectomy 43 year old female    LMP 23  uterine leiomyoma  menstrual period occurs 2x a month, menorrhagia and dysmenorrhea  >1 year of symptoms   MRI from 2023 uterus measuring 10.5 x 5.0 x 8.0 with a large fundal ventricular fibroid measuring 9.8 x 7.6 x 10.5    Patient reports recent URI but denies fever chills, SOB  Patient is scheduled for abdominal myomectomy on 24 with Dr Romero.     LMP  43 year old female , pmhx of depression, breast reduction , laminectomy , former smoker and uterine leiomyoma. LMP 23, menstrual period occurs 2x a month, menorrhagia and dysmenorrhea, >1 year of symptoms. MRI from 2023 uterus measuring 10.5 x 5.0 x 8.0 with a large fundal ventricular fibroid measuring 9.8 x 7.6 x 10.5. Patient reports recent URI with AM cough, but denies fever chills, SOB, wheezing. Patient is scheduled for abdominal myomectomy on 24 with Dr Romero.

## 2024-02-13 ENCOUNTER — TRANSCRIPTION ENCOUNTER (OUTPATIENT)
Age: 44
End: 2024-02-13

## 2024-02-14 ENCOUNTER — TRANSCRIPTION ENCOUNTER (OUTPATIENT)
Age: 44
End: 2024-02-14

## 2024-02-14 ENCOUNTER — APPOINTMENT (OUTPATIENT)
Dept: OBGYN | Facility: HOSPITAL | Age: 44
End: 2024-02-14

## 2024-02-14 ENCOUNTER — INPATIENT (INPATIENT)
Facility: HOSPITAL | Age: 44
LOS: 0 days | Discharge: HOME | DRG: 743 | End: 2024-02-15
Attending: OBSTETRICS & GYNECOLOGY | Admitting: OBSTETRICS & GYNECOLOGY
Payer: COMMERCIAL

## 2024-02-14 ENCOUNTER — RESULT REVIEW (OUTPATIENT)
Age: 44
End: 2024-02-14

## 2024-02-14 VITALS
SYSTOLIC BLOOD PRESSURE: 112 MMHG | WEIGHT: 167.55 LBS | TEMPERATURE: 97 F | HEART RATE: 51 BPM | OXYGEN SATURATION: 100 % | RESPIRATION RATE: 18 BRPM | DIASTOLIC BLOOD PRESSURE: 68 MMHG | HEIGHT: 65.98 IN

## 2024-02-14 DIAGNOSIS — Z98.890 OTHER SPECIFIED POSTPROCEDURAL STATES: Chronic | ICD-10-CM

## 2024-02-14 DIAGNOSIS — Z98.89 OTHER SPECIFIED POSTPROCEDURAL STATES: Chronic | ICD-10-CM

## 2024-02-14 DIAGNOSIS — D25.9 LEIOMYOMA OF UTERUS, UNSPECIFIED: ICD-10-CM

## 2024-02-14 LAB
ANION GAP SERPL CALC-SCNC: 12 MMOL/L — SIGNIFICANT CHANGE UP (ref 5–17)
BLD GP AB SCN SERPL QL: SIGNIFICANT CHANGE UP
BUN SERPL-MCNC: 10.3 MG/DL — SIGNIFICANT CHANGE UP (ref 8–20)
CALCIUM SERPL-MCNC: 8.4 MG/DL — SIGNIFICANT CHANGE UP (ref 8.4–10.5)
CHLORIDE SERPL-SCNC: 101 MMOL/L — SIGNIFICANT CHANGE UP (ref 96–108)
CO2 SERPL-SCNC: 22 MMOL/L — SIGNIFICANT CHANGE UP (ref 22–29)
CREAT SERPL-MCNC: 0.71 MG/DL — SIGNIFICANT CHANGE UP (ref 0.5–1.3)
EGFR: 107 ML/MIN/1.73M2 — SIGNIFICANT CHANGE UP
GLUCOSE SERPL-MCNC: 133 MG/DL — HIGH (ref 70–99)
HCT VFR BLD CALC: 36.3 % — SIGNIFICANT CHANGE UP (ref 34.5–45)
HGB BLD-MCNC: 12 G/DL — SIGNIFICANT CHANGE UP (ref 11.5–15.5)
MAGNESIUM SERPL-MCNC: 1.4 MG/DL — LOW (ref 1.6–2.6)
MCHC RBC-ENTMCNC: 30 PG — SIGNIFICANT CHANGE UP (ref 27–34)
MCHC RBC-ENTMCNC: 33.1 GM/DL — SIGNIFICANT CHANGE UP (ref 32–36)
MCV RBC AUTO: 90.8 FL — SIGNIFICANT CHANGE UP (ref 80–100)
PHOSPHATE SERPL-MCNC: 1.3 MG/DL — LOW (ref 2.4–4.7)
PLATELET # BLD AUTO: 195 K/UL — SIGNIFICANT CHANGE UP (ref 150–400)
POTASSIUM SERPL-MCNC: 3.7 MMOL/L — SIGNIFICANT CHANGE UP (ref 3.5–5.3)
POTASSIUM SERPL-SCNC: 3.7 MMOL/L — SIGNIFICANT CHANGE UP (ref 3.5–5.3)
RBC # BLD: 4 M/UL — SIGNIFICANT CHANGE UP (ref 3.8–5.2)
RBC # FLD: 13 % — SIGNIFICANT CHANGE UP (ref 10.3–14.5)
SODIUM SERPL-SCNC: 135 MMOL/L — SIGNIFICANT CHANGE UP (ref 135–145)
WBC # BLD: 12.87 K/UL — HIGH (ref 3.8–10.5)
WBC # FLD AUTO: 12.87 K/UL — HIGH (ref 3.8–10.5)

## 2024-02-14 PROCEDURE — 44950 APPENDECTOMY: CPT

## 2024-02-14 PROCEDURE — 93010 ELECTROCARDIOGRAM REPORT: CPT

## 2024-02-14 PROCEDURE — 88305 TISSUE EXAM BY PATHOLOGIST: CPT | Mod: 26

## 2024-02-14 PROCEDURE — 88304 TISSUE EXAM BY PATHOLOGIST: CPT | Mod: 26

## 2024-02-14 PROCEDURE — 58146 MYOMECTOMY ABDOM COMPLEX: CPT

## 2024-02-14 DEVICE — INTERCEED 3 X 4": Type: IMPLANTABLE DEVICE | Status: FUNCTIONAL

## 2024-02-14 RX ORDER — DIPHENHYDRAMINE HCL 50 MG
25 CAPSULE ORAL EVERY 4 HOURS
Refills: 0 | Status: DISCONTINUED | OUTPATIENT
Start: 2024-02-14 | End: 2024-02-14

## 2024-02-14 RX ORDER — ACETAMINOPHEN 500 MG
1000 TABLET ORAL ONCE
Refills: 0 | Status: COMPLETED | OUTPATIENT
Start: 2024-02-14 | End: 2024-02-14

## 2024-02-14 RX ORDER — OXYCODONE HYDROCHLORIDE 5 MG/1
10 TABLET ORAL EVERY 4 HOURS
Refills: 0 | Status: DISCONTINUED | OUTPATIENT
Start: 2024-02-14 | End: 2024-02-15

## 2024-02-14 RX ORDER — HYDROMORPHONE HYDROCHLORIDE 2 MG/ML
0.5 INJECTION INTRAMUSCULAR; INTRAVENOUS; SUBCUTANEOUS
Refills: 0 | Status: DISCONTINUED | OUTPATIENT
Start: 2024-02-14 | End: 2024-02-14

## 2024-02-14 RX ORDER — MAGNESIUM SULFATE 500 MG/ML
2 VIAL (ML) INJECTION
Refills: 0 | Status: COMPLETED | OUTPATIENT
Start: 2024-02-14 | End: 2024-02-14

## 2024-02-14 RX ORDER — SODIUM CHLORIDE 9 MG/ML
1000 INJECTION, SOLUTION INTRAVENOUS
Refills: 0 | Status: DISCONTINUED | OUTPATIENT
Start: 2024-02-14 | End: 2024-02-14

## 2024-02-14 RX ORDER — ONDANSETRON 8 MG/1
4 TABLET, FILM COATED ORAL ONCE
Refills: 0 | Status: COMPLETED | OUTPATIENT
Start: 2024-02-14 | End: 2024-02-14

## 2024-02-14 RX ORDER — HYDROMORPHONE HYDROCHLORIDE 2 MG/ML
30 INJECTION INTRAMUSCULAR; INTRAVENOUS; SUBCUTANEOUS
Refills: 0 | Status: DISCONTINUED | OUTPATIENT
Start: 2024-02-14 | End: 2024-02-14

## 2024-02-14 RX ORDER — ENOXAPARIN SODIUM 100 MG/ML
40 INJECTION SUBCUTANEOUS EVERY 24 HOURS
Refills: 0 | Status: DISCONTINUED | OUTPATIENT
Start: 2024-02-14 | End: 2024-02-15

## 2024-02-14 RX ORDER — SIMETHICONE 80 MG/1
80 TABLET, CHEWABLE ORAL EVERY 6 HOURS
Refills: 0 | Status: DISCONTINUED | OUTPATIENT
Start: 2024-02-14 | End: 2024-02-15

## 2024-02-14 RX ORDER — OXYCODONE HYDROCHLORIDE 5 MG/1
5 TABLET ORAL
Refills: 0 | Status: DISCONTINUED | OUTPATIENT
Start: 2024-02-14 | End: 2024-02-15

## 2024-02-14 RX ORDER — ACETAMINOPHEN 500 MG
975 TABLET ORAL EVERY 6 HOURS
Refills: 0 | Status: DISCONTINUED | OUTPATIENT
Start: 2024-02-14 | End: 2024-02-15

## 2024-02-14 RX ORDER — NALOXONE HYDROCHLORIDE 4 MG/.1ML
0.1 SPRAY NASAL
Refills: 0 | Status: DISCONTINUED | OUTPATIENT
Start: 2024-02-14 | End: 2024-02-14

## 2024-02-14 RX ORDER — METOCLOPRAMIDE HCL 10 MG
10 TABLET ORAL ONCE
Refills: 0 | Status: COMPLETED | OUTPATIENT
Start: 2024-02-14 | End: 2024-02-14

## 2024-02-14 RX ORDER — POTASSIUM PHOSPHATE, MONOBASIC POTASSIUM PHOSPHATE, DIBASIC 236; 224 MG/ML; MG/ML
30 INJECTION, SOLUTION INTRAVENOUS ONCE
Refills: 0 | Status: COMPLETED | OUTPATIENT
Start: 2024-02-14 | End: 2024-02-14

## 2024-02-14 RX ORDER — CEFAZOLIN SODIUM 1 G
2000 VIAL (EA) INJECTION ONCE
Refills: 0 | Status: DISCONTINUED | OUTPATIENT
Start: 2024-02-14 | End: 2024-02-14

## 2024-02-14 RX ORDER — ONDANSETRON 8 MG/1
8 TABLET, FILM COATED ORAL EVERY 8 HOURS
Refills: 0 | Status: DISCONTINUED | OUTPATIENT
Start: 2024-02-14 | End: 2024-02-15

## 2024-02-14 RX ORDER — SODIUM CHLORIDE 9 MG/ML
3 INJECTION INTRAMUSCULAR; INTRAVENOUS; SUBCUTANEOUS EVERY 8 HOURS
Refills: 0 | Status: DISCONTINUED | OUTPATIENT
Start: 2024-02-14 | End: 2024-02-14

## 2024-02-14 RX ORDER — ACETAMINOPHEN 500 MG
975 TABLET ORAL ONCE
Refills: 0 | Status: COMPLETED | OUTPATIENT
Start: 2024-02-14 | End: 2024-02-14

## 2024-02-14 RX ORDER — SODIUM CHLORIDE 9 MG/ML
1000 INJECTION, SOLUTION INTRAVENOUS ONCE
Refills: 0 | Status: COMPLETED | OUTPATIENT
Start: 2024-02-14 | End: 2024-02-14

## 2024-02-14 RX ORDER — CELECOXIB 200 MG/1
400 CAPSULE ORAL ONCE
Refills: 0 | Status: COMPLETED | OUTPATIENT
Start: 2024-02-14 | End: 2024-02-14

## 2024-02-14 RX ORDER — KETOROLAC TROMETHAMINE 30 MG/ML
30 SYRINGE (ML) INJECTION EVERY 8 HOURS
Refills: 0 | Status: DISCONTINUED | OUTPATIENT
Start: 2024-02-14 | End: 2024-02-15

## 2024-02-14 RX ORDER — IBUPROFEN 200 MG
600 TABLET ORAL EVERY 6 HOURS
Refills: 0 | Status: DISCONTINUED | OUTPATIENT
Start: 2024-02-14 | End: 2024-02-15

## 2024-02-14 RX ORDER — ONDANSETRON 8 MG/1
4 TABLET, FILM COATED ORAL EVERY 6 HOURS
Refills: 0 | Status: DISCONTINUED | OUTPATIENT
Start: 2024-02-14 | End: 2024-02-14

## 2024-02-14 RX ADMIN — SODIUM CHLORIDE 75 MILLILITER(S): 9 INJECTION, SOLUTION INTRAVENOUS at 10:11

## 2024-02-14 RX ADMIN — POTASSIUM PHOSPHATE, MONOBASIC POTASSIUM PHOSPHATE, DIBASIC 83.33 MILLIMOLE(S): 236; 224 INJECTION, SOLUTION INTRAVENOUS at 15:13

## 2024-02-14 RX ADMIN — HYDROMORPHONE HYDROCHLORIDE 0.5 MILLIGRAM(S): 2 INJECTION INTRAMUSCULAR; INTRAVENOUS; SUBCUTANEOUS at 10:35

## 2024-02-14 RX ADMIN — Medication 975 MILLIGRAM(S): at 20:38

## 2024-02-14 RX ADMIN — Medication 30 MILLIGRAM(S): at 23:08

## 2024-02-14 RX ADMIN — Medication 50 GRAM(S): at 13:54

## 2024-02-14 RX ADMIN — Medication 975 MILLIGRAM(S): at 06:49

## 2024-02-14 RX ADMIN — Medication 1000 MILLIGRAM(S): at 14:15

## 2024-02-14 RX ADMIN — HYDROMORPHONE HYDROCHLORIDE 0.5 MILLIGRAM(S): 2 INJECTION INTRAMUSCULAR; INTRAVENOUS; SUBCUTANEOUS at 10:45

## 2024-02-14 RX ADMIN — HYDROMORPHONE HYDROCHLORIDE 0.5 MILLIGRAM(S): 2 INJECTION INTRAMUSCULAR; INTRAVENOUS; SUBCUTANEOUS at 10:05

## 2024-02-14 RX ADMIN — HYDROMORPHONE HYDROCHLORIDE 0.5 MILLIGRAM(S): 2 INJECTION INTRAMUSCULAR; INTRAVENOUS; SUBCUTANEOUS at 10:15

## 2024-02-14 RX ADMIN — Medication 975 MILLIGRAM(S): at 21:30

## 2024-02-14 RX ADMIN — HYDROMORPHONE HYDROCHLORIDE 30 MILLILITER(S): 2 INJECTION INTRAMUSCULAR; INTRAVENOUS; SUBCUTANEOUS at 11:00

## 2024-02-14 RX ADMIN — ONDANSETRON 4 MILLIGRAM(S): 8 TABLET, FILM COATED ORAL at 11:05

## 2024-02-14 RX ADMIN — ENOXAPARIN SODIUM 40 MILLIGRAM(S): 100 INJECTION SUBCUTANEOUS at 23:08

## 2024-02-14 RX ADMIN — ONDANSETRON 4 MILLIGRAM(S): 8 TABLET, FILM COATED ORAL at 14:39

## 2024-02-14 RX ADMIN — CELECOXIB 400 MILLIGRAM(S): 200 CAPSULE ORAL at 06:50

## 2024-02-14 RX ADMIN — Medication 10 MILLIGRAM(S): at 12:59

## 2024-02-14 RX ADMIN — Medication 30 MILLIGRAM(S): at 15:16

## 2024-02-14 RX ADMIN — SODIUM CHLORIDE 2000 MILLILITER(S): 9 INJECTION, SOLUTION INTRAVENOUS at 12:52

## 2024-02-14 RX ADMIN — Medication 50 GRAM(S): at 12:51

## 2024-02-14 RX ADMIN — Medication 400 MILLIGRAM(S): at 13:45

## 2024-02-14 RX ADMIN — SODIUM CHLORIDE 2000 MILLILITER(S): 9 INJECTION, SOLUTION INTRAVENOUS at 14:18

## 2024-02-14 NOTE — BRIEF OPERATIVE NOTE - NSICDXBRIEFPOSTOP_GEN_ALL_CORE_FT
POST-OP DIAGNOSIS:  Fibroids 14-Feb-2024 09:19:06 Fibroid adhered to appendix Francisco Slaughter  
POST-OP DIAGNOSIS:  Fibroids 14-Feb-2024 09:19:06 Fibroid adhered to appendix Francisco Slaughter

## 2024-02-14 NOTE — BRIEF OPERATIVE NOTE - NSICDXBRIEFPREOP_GEN_ALL_CORE_FT
PRE-OP DIAGNOSIS:  Fibroids 14-Feb-2024 09:18:56  Francisco Slaughter  
PRE-OP DIAGNOSIS:  Fibroids 14-Feb-2024 09:18:56  Francisco Slaughter

## 2024-02-14 NOTE — BRIEF OPERATIVE NOTE - NSICDXBRIEFPROCEDURE_GEN_ALL_CORE_FT
PROCEDURES:  Open appendectomy 14-Feb-2024 09:17:57  Francisco Slaughter  
PROCEDURES:  Myomectomy, uterus, 1-4 myomas, abdominal approach 14-Feb-2024 09:37:12  Trish Osorio

## 2024-02-14 NOTE — BRIEF OPERATIVE NOTE - OPERATION/FINDINGS
EUA revealed an irregular shaped fibroid uterus. 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 Intraoperative consult made; appendectomy performed by general surgery. Good hemostasis noted at end of case. Interceed placed.

## 2024-02-14 NOTE — PROVIDER CONTACT NOTE (CHANGE IN STATUS NOTIFICATION) - SITUATION
MD Osorio notified and aware of all lab results, including low magnesium and phosphorus. K phos and magnesium ordered by TAMIA Sharma. HR continues to be in the low 40's, patient vomiting. Decreased UO. MD Osorio aware.

## 2024-02-14 NOTE — PROVIDER CONTACT NOTE (CHANGE IN STATUS NOTIFICATION) - ASSESSMENT
MD Osorio at bedside to assess at 12:50. MD Romero notified and aware of status, at bedside to assess at 12:55. MD Kay also notified and aware.

## 2024-02-14 NOTE — BRIEF OPERATIVE NOTE - OPERATION/FINDINGS
Intraoperative consult, fibroid adhered to appendix, open appendectomy performed, x2 silk at base, appendiceal stump inverted with x2 purse string sutures. Intraoperative consult, fibroid adhered to appendix (paracytic vessel to mesoappendix)  , open appendectomy performed, x2 silk at base, appendiceal stump inverted with x2 purse string sutures.

## 2024-02-15 ENCOUNTER — TRANSCRIPTION ENCOUNTER (OUTPATIENT)
Age: 44
End: 2024-02-15

## 2024-02-15 VITALS
RESPIRATION RATE: 19 BRPM | DIASTOLIC BLOOD PRESSURE: 67 MMHG | OXYGEN SATURATION: 96 % | HEART RATE: 66 BPM | TEMPERATURE: 98 F | SYSTOLIC BLOOD PRESSURE: 108 MMHG

## 2024-02-15 LAB
ANION GAP SERPL CALC-SCNC: 10 MMOL/L — SIGNIFICANT CHANGE UP (ref 5–17)
BASOPHILS # BLD AUTO: 0.02 K/UL — SIGNIFICANT CHANGE UP (ref 0–0.2)
BASOPHILS NFR BLD AUTO: 0.3 % — SIGNIFICANT CHANGE UP (ref 0–2)
BUN SERPL-MCNC: 6.6 MG/DL — LOW (ref 8–20)
CALCIUM SERPL-MCNC: 8 MG/DL — LOW (ref 8.4–10.5)
CHLORIDE SERPL-SCNC: 105 MMOL/L — SIGNIFICANT CHANGE UP (ref 96–108)
CO2 SERPL-SCNC: 24 MMOL/L — SIGNIFICANT CHANGE UP (ref 22–29)
CREAT SERPL-MCNC: 0.64 MG/DL — SIGNIFICANT CHANGE UP (ref 0.5–1.3)
EGFR: 112 ML/MIN/1.73M2 — SIGNIFICANT CHANGE UP
EOSINOPHIL # BLD AUTO: 0.06 K/UL — SIGNIFICANT CHANGE UP (ref 0–0.5)
EOSINOPHIL NFR BLD AUTO: 0.9 % — SIGNIFICANT CHANGE UP (ref 0–6)
GLUCOSE SERPL-MCNC: 100 MG/DL — HIGH (ref 70–99)
HCT VFR BLD CALC: 35.8 % — SIGNIFICANT CHANGE UP (ref 34.5–45)
HGB BLD-MCNC: 11.9 G/DL — SIGNIFICANT CHANGE UP (ref 11.5–15.5)
IMM GRANULOCYTES NFR BLD AUTO: 0.3 % — SIGNIFICANT CHANGE UP (ref 0–0.9)
LYMPHOCYTES # BLD AUTO: 2.17 K/UL — SIGNIFICANT CHANGE UP (ref 1–3.3)
LYMPHOCYTES # BLD AUTO: 32.1 % — SIGNIFICANT CHANGE UP (ref 13–44)
MAGNESIUM SERPL-MCNC: 2.3 MG/DL — SIGNIFICANT CHANGE UP (ref 1.8–2.6)
MCHC RBC-ENTMCNC: 30.7 PG — SIGNIFICANT CHANGE UP (ref 27–34)
MCHC RBC-ENTMCNC: 33.2 GM/DL — SIGNIFICANT CHANGE UP (ref 32–36)
MCV RBC AUTO: 92.3 FL — SIGNIFICANT CHANGE UP (ref 80–100)
MONOCYTES # BLD AUTO: 0.55 K/UL — SIGNIFICANT CHANGE UP (ref 0–0.9)
MONOCYTES NFR BLD AUTO: 8.1 % — SIGNIFICANT CHANGE UP (ref 2–14)
NEUTROPHILS # BLD AUTO: 3.94 K/UL — SIGNIFICANT CHANGE UP (ref 1.8–7.4)
NEUTROPHILS NFR BLD AUTO: 58.3 % — SIGNIFICANT CHANGE UP (ref 43–77)
PHOSPHATE SERPL-MCNC: 2.8 MG/DL — SIGNIFICANT CHANGE UP (ref 2.4–4.7)
PLATELET # BLD AUTO: 199 K/UL — SIGNIFICANT CHANGE UP (ref 150–400)
POTASSIUM SERPL-MCNC: 3.5 MMOL/L — SIGNIFICANT CHANGE UP (ref 3.5–5.3)
POTASSIUM SERPL-SCNC: 3.5 MMOL/L — SIGNIFICANT CHANGE UP (ref 3.5–5.3)
RBC # BLD: 3.88 M/UL — SIGNIFICANT CHANGE UP (ref 3.8–5.2)
RBC # FLD: 13.4 % — SIGNIFICANT CHANGE UP (ref 10.3–14.5)
SODIUM SERPL-SCNC: 139 MMOL/L — SIGNIFICANT CHANGE UP (ref 135–145)
WBC # BLD: 6.76 K/UL — SIGNIFICANT CHANGE UP (ref 3.8–10.5)
WBC # FLD AUTO: 6.76 K/UL — SIGNIFICANT CHANGE UP (ref 3.8–10.5)

## 2024-02-15 PROCEDURE — 85027 COMPLETE CBC AUTOMATED: CPT

## 2024-02-15 PROCEDURE — 88304 TISSUE EXAM BY PATHOLOGIST: CPT

## 2024-02-15 PROCEDURE — 93005 ELECTROCARDIOGRAM TRACING: CPT

## 2024-02-15 PROCEDURE — 80048 BASIC METABOLIC PNL TOTAL CA: CPT

## 2024-02-15 PROCEDURE — 84100 ASSAY OF PHOSPHORUS: CPT

## 2024-02-15 PROCEDURE — 86850 RBC ANTIBODY SCREEN: CPT

## 2024-02-15 PROCEDURE — 85025 COMPLETE CBC W/AUTO DIFF WBC: CPT

## 2024-02-15 PROCEDURE — 99024 POSTOP FOLLOW-UP VISIT: CPT

## 2024-02-15 PROCEDURE — 83735 ASSAY OF MAGNESIUM: CPT

## 2024-02-15 PROCEDURE — 86900 BLOOD TYPING SEROLOGIC ABO: CPT

## 2024-02-15 PROCEDURE — 86901 BLOOD TYPING SEROLOGIC RH(D): CPT

## 2024-02-15 PROCEDURE — 88305 TISSUE EXAM BY PATHOLOGIST: CPT

## 2024-02-15 PROCEDURE — C1765: CPT

## 2024-02-15 PROCEDURE — 36415 COLL VENOUS BLD VENIPUNCTURE: CPT

## 2024-02-15 RX ORDER — ACETAMINOPHEN 500 MG
2 TABLET ORAL
Qty: 40 | Refills: 0
Start: 2024-02-15 | End: 2024-02-19

## 2024-02-15 RX ORDER — IBUPROFEN 200 MG
1 TABLET ORAL
Qty: 20 | Refills: 0
Start: 2024-02-15 | End: 2024-02-19

## 2024-02-15 RX ADMIN — Medication 975 MILLIGRAM(S): at 04:33

## 2024-02-15 RX ADMIN — Medication 30 MILLIGRAM(S): at 00:05

## 2024-02-15 RX ADMIN — Medication 30 MILLIGRAM(S): at 06:01

## 2024-02-15 RX ADMIN — Medication 975 MILLIGRAM(S): at 05:30

## 2024-02-15 RX ADMIN — Medication 975 MILLIGRAM(S): at 13:37

## 2024-02-15 RX ADMIN — Medication 975 MILLIGRAM(S): at 12:37

## 2024-02-15 RX ADMIN — Medication 30 MILLIGRAM(S): at 07:00

## 2024-02-15 NOTE — PROGRESS NOTE ADULT - ASSESSMENT
A/P: 42yo now POD#1 s/p abdominal myomectomy; c/b appendectomy.     Gen: VSS  Neuro: Pain well controlled on current regimen  CV: no active issues  Pulm: incentive spirometer use encouraged  GI: Bowel sounds normal, tolerating PO diet; awaiting flatus.   : Horton removed, awaiting void.   Heme: Hgb 14.5>12.  DVT ppx: ambulation encouraged, SCDs when in bed, lovenox  Dispo: likely d/c home POD#2; will discuss with attending.    A/P: 44yo now POD#1 s/p abdominal myomectomy; c/b appendectomy.     Gen: VSS  Neuro: Pain well controlled on current regimen; will add abdominal binder for additional support.   CV: no active issues  Pulm: incentive spirometer use encouraged  GI: Bowel sounds normal, tolerating PO diet; awaiting flatus.   : Horton removed, awaiting void.   Heme: Hgb 14.5>12.  DVT ppx: ambulation encouraged, SCDs when in bed, lovenox  Dispo: likely d/c home POD#2; will discuss with attending.

## 2024-02-15 NOTE — DISCHARGE NOTE PROVIDER - CARE PROVIDER_API CALL
Javier Romero  Obstetrics and Gynecology  [address] [place] [id number]  Phone: [phone]  Fax: [phone]  Established Patient  Follow Up Time: 2 weeks

## 2024-02-15 NOTE — DISCHARGE NOTE PROVIDER - NSDCFUADDAPPT_GEN_ALL_CORE_FT
Please call and make a follow-up appointment to be seen at the [facility] 10-14 days after discharge. You can resume a regular diet.

## 2024-02-15 NOTE — DISCHARGE NOTE PROVIDER - NSDCCPTREATMENT_GEN_ALL_CORE_FT
PRINCIPAL PROCEDURE  Procedure: Myomectomy, uterus, 1-4 myomas, abdominal approach  Findings and Treatment:

## 2024-02-15 NOTE — DISCHARGE NOTE PROVIDER - NSFOLLOWUPCLINICS_GEN_ALL_ED_FT
[facility] Acute Care Surgery  Acute Care Surgery  [address] [place] [id number]  Phone: [phone]  Fax:

## 2024-02-15 NOTE — PROGRESS NOTE ADULT - SUBJECTIVE AND OBJECTIVE BOX
HUGH SAMSON is a 42yo now POD#1 s/p abdominal myomectomy; c/b appendectomy.     S:    Patient was seen and examined at bedside. Pain is controlled with PRN medication   Tolerating regular diet, denies N/V.   Ambulating without difficulty.   She denies headache, chest pain, SOB, and leg pain.  Horton removed this morning; has not voided yet.   - flatus/-BM  Patient reports pain when she coughs.     O:   T(C): 36.6 (02-15-24 @ 04:36), Max: 36.6 (02-15-24 @ 04:36)  HR: 69 (02-15-24 @ 04:36) (41 - 82)  BP: 98/56 (02-15-24 @ 04:36) (92/59 - 113/69)  RR: 18 (02-15-24 @ 04:36) (12 - 20)  SpO2: 97% (02-15-24 @ 04:36) (95% - 100%)    Gen: NAD, AOx3  CV: RRR  Pulm: CTAB  Abdomen: soft, nondistended, appropriately tender, + BS   Incision: clean dry and intact  Extrem: no calf tenderness or edema     Labs:       02-14-24 @ 07:01  -  02-15-24 @ 07:00  --------------------------------------------------------  IN: 3483.3 mL / OUT: 3285 mL / NET: 198.3 mL                HUGH SAMSON is a 42yo now POD#1 s/p abdominal myomectomy; c/b appendectomy.     S:    Patient was seen and examined at bedside. Pain is controlled with PRN medication   Tolerating regular diet, denies N/V.   Ambulating without difficulty.   She denies headache, chest pain, SOB, and leg pain.  Horton removed this morning; has not voided yet.   - flatus/-BM  Patient reports pain when she coughs.     O:   T(C): 36.6 (02-15-24 @ 04:36), Max: 36.6 (02-15-24 @ 04:36)  HR: 69 (02-15-24 @ 04:36) (41 - 82)  BP: 98/56 (02-15-24 @ 04:36) (92/59 - 113/69)  RR: 18 (02-15-24 @ 04:36) (12 - 20)  SpO2: 97% (02-15-24 @ 04:36) (95% - 100%)    Gen: NAD, AOx3  CV: RRR  Pulm: CTAB  Abdomen: soft, nondistended, appropriately tender, + BS   Incision: clean dry and intact with Mepilex dressing  Extrem: no calf tenderness or edema     Labs:       02-14-24 @ 07:01  -  02-15-24 @ 07:00  --------------------------------------------------------  IN: 3483.3 mL / OUT: 3285 mL / NET: 198.3 mL

## 2024-02-15 NOTE — DISCHARGE NOTE PROVIDER - HOSPITAL COURSE
Patient is a 43 y.o. who presented for a scheduled abdominal myomectomy; intraoperative consult was made due to adhesions from appendix to fibroid requiring appendectomy. The remainder of her surgery was uncomplicated as well as her postoperative course. She was discharged in stable condition.

## 2024-02-15 NOTE — PROGRESS NOTE ADULT - SUBJECTIVE AND OBJECTIVE BOX
POD # 1    Patient resting comfortably in NAD  Afebrile VSS  abdomen  soft  not tender  Incision  drfessing in place  dry  Extrem  No Homans  Voiding +  Flatus +    WBC 6.76  H/H 11.9/35.8  Platel 199    Patient s/p Expl. Lapar  Myomectomy  Appendectomy  Continue post op care

## 2024-02-15 NOTE — DISCHARGE NOTE NURSING/CASE MANAGEMENT/SOCIAL WORK - PATIENT PORTAL LINK FT
You can access the FollowMyHealth Patient Portal offered by [facility] by registering at the following website: [email or web address]. By joining [facility]’s FollowMyHealth portal, you will also be able to view your health information using other applications (apps) compatible with our system.

## 2024-02-15 NOTE — PROGRESS NOTE ADULT - NS ATTEND AMEND GEN_ALL_CORE FT
I have seen and examined the patient during rounds form [id number] hrs  events noted    pain under control  Tolerating diet    Discussed appendectomy.  ADAt  DVT chemoprophylaxes  Will see in office post op in 7-10 days.

## 2024-02-15 NOTE — DISCHARGE NOTE PROVIDER - NSDCMRMEDTOKEN_GEN_ALL_CORE_FT
escitalopram 10 mg oral tablet: 1.5 tab(s) orally once a day   escitalopram 10 mg oral tablet: 1.5 tab(s) orally once a day   mg oral tablet: 1 tab(s) orally every 6 hours as needed for  moderate pain  Tylenol Extra Strength 500 mg oral tablet: 2 tab(s) orally every 6 hours as needed for  mild pain

## 2024-02-15 NOTE — PROGRESS NOTE ADULT - ASSESSMENT
42 yo F s/p abdominal myomectomy, intraoperative surgery c/s for adherence to appendix, s/p appendectomy.    PLAN  -multimodal pain control  -Tolerating diet  -AROBF  -Encourage OOB  -Rest of care per primary team

## 2024-02-15 NOTE — PROGRESS NOTE ADULT - SUBJECTIVE AND OBJECTIVE BOX
Subjective: Pt evaluated at bedside this AM. Resting in NAD. Tolerated procedure well. NAEON.    Status Post: abdominal myomectomy with appendectomy.       MEDICATIONS  (STANDING):  acetaminophen     Tablet .. 975 milliGRAM(s) Oral every 6 hours  enoxaparin Injectable 40 milliGRAM(s) SubCutaneous every 24 hours    MEDICATIONS  (PRN):  ibuprofen  Tablet. 600 milliGRAM(s) Oral every 6 hours PRN Mild Pain (1 - 3)  ondansetron    Tablet 8 milliGRAM(s) Oral every 8 hours PRN Nausea and/or Vomiting  oxyCODONE    IR 5 milliGRAM(s) Oral every 3 hours PRN Moderate Pain (4 - 6)  oxyCODONE    IR 10 milliGRAM(s) Oral every 4 hours PRN Severe Pain (7 - 10)  simethicone 80 milliGRAM(s) Chew every 6 hours PRN Gas      Vital Signs Last 24 Hrs  T(C): 36.6 (15 Feb 2024 04:36), Max: 36.6 (15 Feb 2024 04:36)  T(F): 97.9 (15 Feb 2024 04:36), Max: 97.9 (15 Feb 2024 04:36)  HR: 69 (15 Feb 2024 04:36) (41 - 82)  BP: 98/56 (15 Feb 2024 04:36) (92/59 - 113/69)  BP(mean): 75 (14 Feb 2024 15:00) (70 - 87)  RR: 18 (15 Feb 2024 04:36) (12 - 20)  SpO2: 97% (15 Feb 2024 04:36) (95% - 100%)    Parameters below as of 15 Feb 2024 04:36  Patient On (Oxygen Delivery Method): room air        Physical Exam:    Constitutional: NAD  HEENT: EOMI  Respiratory: Respirations non-labored, no accessory muscle use  Gastrointestinal: Soft, appropriately tender, non-distended, no guarding or rebound. Incisions c/d/i  Extremities: No peripheral edema, No cyanosis  Neurological: A&O x 3; without gross deficit  Psych: Cooperative, appropriate affect      LABS:                        12.0   12.87 )-----------( 195      ( 14 Feb 2024 11:50 )             36.3     02-14    135  |  101  |  10.3  ----------------------------<  133<H>  3.7   |  22.0  |  0.71    Ca    8.4      14 Feb 2024 11:50  Phos  1.3     02-14  Mg     1.4     02-14        Urinalysis Basic - ( 14 Feb 2024 11:50 )    Color: x / Appearance: x / SG: x / pH: x  Gluc: 133 mg/dL / Ketone: x  / Bili: x / Urobili: x   Blood: x / Protein: x / Nitrite: x   Leuk Esterase: x / RBC: x / WBC x   Sq Epi: x / Non Sq Epi: x / Bacteria: x

## 2024-02-16 DIAGNOSIS — N92.0 EXCESSIVE AND FREQUENT MENSTRUATION WITH REGULAR CYCLE: ICD-10-CM

## 2024-02-16 DIAGNOSIS — N94.6 DYSMENORRHEA, UNSPECIFIED: ICD-10-CM

## 2024-02-16 PROBLEM — F41.9 ANXIETY DISORDER, UNSPECIFIED: Chronic | Status: ACTIVE | Noted: 2024-01-25

## 2024-02-17 ENCOUNTER — TRANSCRIPTION ENCOUNTER (OUTPATIENT)
Age: 44
End: 2024-02-17

## 2024-02-20 ENCOUNTER — APPOINTMENT (OUTPATIENT)
Dept: OBGYN | Facility: CLINIC | Age: 44
End: 2024-02-20
Payer: COMMERCIAL

## 2024-02-20 VITALS
HEART RATE: 62 BPM | WEIGHT: 176 LBS | BODY MASS INDEX: 28.41 KG/M2 | DIASTOLIC BLOOD PRESSURE: 79 MMHG | SYSTOLIC BLOOD PRESSURE: 114 MMHG

## 2024-02-20 DIAGNOSIS — Z90.49 ACQUIRED ABSENCE OF OTHER SPECIFIED PARTS OF DIGESTIVE TRACT: ICD-10-CM

## 2024-02-20 DIAGNOSIS — Z98.890 OTHER SPECIFIED POSTPROCEDURAL STATES: ICD-10-CM

## 2024-02-20 DIAGNOSIS — Z12.39 ENCOUNTER FOR OTHER SCREENING FOR MALIGNANT NEOPLASM OF BREAST: ICD-10-CM

## 2024-02-20 DIAGNOSIS — Z12.4 ENCOUNTER FOR SCREENING FOR MALIGNANT NEOPLASM OF CERVIX: ICD-10-CM

## 2024-02-20 DIAGNOSIS — Z01.419 ENCOUNTER FOR GYNECOLOGICAL EXAMINATION (GENERAL) (ROUTINE) W/OUT ABNORMAL FINDINGS: ICD-10-CM

## 2024-02-20 DIAGNOSIS — Z92.89 PERSONAL HISTORY OF OTHER MEDICAL TREATMENT: ICD-10-CM

## 2024-02-20 DIAGNOSIS — Z11.51 ENCOUNTER FOR SCREENING FOR HUMAN PAPILLOMAVIRUS (HPV): ICD-10-CM

## 2024-02-20 PROBLEM — D25.9 LEIOMYOMA OF UTERUS, UNSPECIFIED: Chronic | Status: ACTIVE | Noted: 2024-01-25

## 2024-02-20 LAB — SURGICAL PATHOLOGY STUDY: SIGNIFICANT CHANGE UP

## 2024-02-20 PROCEDURE — 99024 POSTOP FOLLOW-UP VISIT: CPT

## 2024-02-20 NOTE — PLAN
[FreeTextEntry1] : Patient is a 43-year-old  0 last menstrual period 2024 Patient presents for postop follow-up Patient underwent a myomectomy and also appendectomy with lysis of adhesions on 2024 Patient denies any difficulty passing flatus, passing urine, or bowel movements, Mepilex dressing removed Incision clean, dry, intact, healing well Patient denies any complaints Follow-up 2 weeks  Norma was present as a chaperone for the entire assessment and examination of this patient

## 2024-02-26 ENCOUNTER — TRANSCRIPTION ENCOUNTER (OUTPATIENT)
Age: 44
End: 2024-02-26

## 2024-03-05 ENCOUNTER — APPOINTMENT (OUTPATIENT)
Dept: OBGYN | Facility: CLINIC | Age: 44
End: 2024-03-05
Payer: COMMERCIAL

## 2024-03-05 VITALS
HEART RATE: 60 BPM | BODY MASS INDEX: 28.57 KG/M2 | WEIGHT: 177 LBS | SYSTOLIC BLOOD PRESSURE: 116 MMHG | DIASTOLIC BLOOD PRESSURE: 79 MMHG

## 2024-03-05 PROCEDURE — 99024 POSTOP FOLLOW-UP VISIT: CPT

## 2024-03-05 NOTE — PLAN
[FreeTextEntry1] : Patient is a 43-year-old  1 para 0-0-1-0 status post myomectomy and appendectomy on 2024 Patient presents for postop visit Patient denies any difficulty passing urine, passing gas, no passing bowel movements Incision healed well,, clean, dry, intact Pelvic exam shows normal female external genitalia vagina no lesions, cervix appropriate size, uterus anteverted slightly boggy but nontender, Pathology report shows appendix with no significant pathology Uterine fibroids leiomyoma no evidence of abnormality Results discussed with patient Questions answered Reassured Follow-up 4 weeks  Norma was present as a chaperone for the entire assessment examination of this patient

## 2024-04-02 ENCOUNTER — APPOINTMENT (OUTPATIENT)
Dept: OBGYN | Facility: CLINIC | Age: 44
End: 2024-04-02
Payer: COMMERCIAL

## 2024-04-02 VITALS
WEIGHT: 180 LBS | DIASTOLIC BLOOD PRESSURE: 68 MMHG | HEIGHT: 66 IN | BODY MASS INDEX: 28.93 KG/M2 | SYSTOLIC BLOOD PRESSURE: 111 MMHG | HEART RATE: 45 BPM

## 2024-04-02 PROCEDURE — 99024 POSTOP FOLLOW-UP VISIT: CPT

## 2024-04-02 NOTE — PLAN
[FreeTextEntry1] : Patient is a 43-year-old  0 last menstrual period January 15, 2024 Patient presents postop visit, patient underwent a myomectomy and appendectomy on  Pathology report reviewed with patient,, benign findings Incision, healed well, clean, dry, intact Pelvic exam shows normal female external genitalia vagina lesions, cervix appropriate size uterus tip in the cul-de-sac nontender Essentially benign postop exam Patient denies any difficulty passing urine, passing flatus, or bowel movements Follow-up for next annual exam for full exam and Pap smear  Rachel was present as a chaperone for the entire assessment and examination of this patient

## 2024-04-09 ENCOUNTER — APPOINTMENT (OUTPATIENT)
Dept: INTERNAL MEDICINE | Facility: CLINIC | Age: 44
End: 2024-04-09
Payer: COMMERCIAL

## 2024-04-09 VITALS
WEIGHT: 173 LBS | HEIGHT: 66 IN | OXYGEN SATURATION: 98 % | BODY MASS INDEX: 27.8 KG/M2 | DIASTOLIC BLOOD PRESSURE: 67 MMHG | HEART RATE: 52 BPM | SYSTOLIC BLOOD PRESSURE: 98 MMHG | RESPIRATION RATE: 12 BRPM

## 2024-04-09 DIAGNOSIS — D25.9 LEIOMYOMA OF UTERUS, UNSPECIFIED: ICD-10-CM

## 2024-04-09 PROCEDURE — G2211 COMPLEX E/M VISIT ADD ON: CPT

## 2024-04-09 PROCEDURE — 99213 OFFICE O/P EST LOW 20 MIN: CPT

## 2024-04-09 RX ORDER — METRONIDAZOLE 7.5 MG/G
0.75 GEL VAGINAL
Qty: 70 | Refills: 2 | Status: DISCONTINUED | COMMUNITY
Start: 2023-12-14 | End: 2024-04-09

## 2024-04-14 PROBLEM — D25.9 FIBROID, UTERINE: Status: ACTIVE | Noted: 2024-02-16

## 2024-04-14 NOTE — HISTORY OF PRESENT ILLNESS
[FreeTextEntry1] : f/u after fibroid  [de-identified] : Darby is here today for follow up.She underwent recent myomectomy. Pathology benign. Recovered well. Feels much better in terms of abdominal discomfort. No new concerns. Feels mood is well controlled. She is happy with her decision to have the surgery.

## 2024-04-14 NOTE — ASSESSMENT
[FreeTextEntry1] : 1. Anxiety/depression  Continue lexapro 15mg qd  2. Recent myomectomy Recovering well  F/U CPE

## 2024-04-14 NOTE — PHYSICAL EXAM
[No Acute Distress] : no acute distress [Well Nourished] : well nourished [Well Developed] : well developed [Well-Appearing] : well-appearing [Normal Sclera/Conjunctiva] : normal sclera/conjunctiva [No Respiratory Distress] : no respiratory distress  [No Accessory Muscle Use] : no accessory muscle use [Clear to Auscultation] : lungs were clear to auscultation bilaterally [Normal Rate] : normal rate  [Regular Rhythm] : with a regular rhythm [Normal S1, S2] : normal S1 and S2 [No Murmur] : no murmur heard [No Edema] : there was no peripheral edema [Soft] : abdomen soft [Non Tender] : non-tender [Non-distended] : non-distended [Normal Bowel Sounds] : normal bowel sounds [Normal Gait] : normal gait [Alert and Oriented x3] : oriented to person, place, and time [de-identified] : Well healed surgical site

## 2024-04-14 NOTE — REVIEW OF SYSTEMS
[Fever] : no fever [Chills] : no chills [Night Sweats] : no night sweats [Chest Pain] : no chest pain [Shortness Of Breath] : no shortness of breath [Headache] : no headache [Abdominal Pain] : no abdominal pain [Dizziness] : no dizziness

## 2024-05-02 ENCOUNTER — RX RENEWAL (OUTPATIENT)
Age: 44
End: 2024-05-02

## 2024-05-02 RX ORDER — ESCITALOPRAM OXALATE 10 MG/1
10 TABLET ORAL
Qty: 135 | Refills: 0 | Status: ACTIVE | COMMUNITY
Start: 2022-10-11 | End: 1900-01-01

## 2024-06-06 ENCOUNTER — APPOINTMENT (OUTPATIENT)
Dept: INTERNAL MEDICINE | Facility: CLINIC | Age: 44
End: 2024-06-06
Payer: COMMERCIAL

## 2024-06-06 VITALS
HEART RATE: 60 BPM | SYSTOLIC BLOOD PRESSURE: 106 MMHG | HEIGHT: 66 IN | BODY MASS INDEX: 28.77 KG/M2 | WEIGHT: 179 LBS | RESPIRATION RATE: 12 BRPM | DIASTOLIC BLOOD PRESSURE: 64 MMHG | OXYGEN SATURATION: 98 %

## 2024-06-06 DIAGNOSIS — R92.30 DENSE BREASTS, UNSPECIFIED: ICD-10-CM

## 2024-06-06 DIAGNOSIS — Z00.00 ENCOUNTER FOR GENERAL ADULT MEDICAL EXAMINATION W/OUT ABNORMAL FINDINGS: ICD-10-CM

## 2024-06-06 DIAGNOSIS — R19.00 INTRA-ABDOMINAL AND PELVIC SWELLING, MASS AND LUMP, UNSPECIFIED SITE: ICD-10-CM

## 2024-06-06 DIAGNOSIS — F41.9 ANXIETY DISORDER, UNSPECIFIED: ICD-10-CM

## 2024-06-06 DIAGNOSIS — F32.A DEPRESSION, UNSPECIFIED: ICD-10-CM

## 2024-06-06 DIAGNOSIS — E55.9 VITAMIN D DEFICIENCY, UNSPECIFIED: ICD-10-CM

## 2024-06-06 PROCEDURE — 93000 ELECTROCARDIOGRAM COMPLETE: CPT

## 2024-06-06 PROCEDURE — 99396 PREV VISIT EST AGE 40-64: CPT

## 2024-06-06 PROCEDURE — 99213 OFFICE O/P EST LOW 20 MIN: CPT | Mod: 25

## 2024-06-06 NOTE — HEALTH RISK ASSESSMENT
[Yes] : Yes [No] : In the past 12 months have you used drugs other than those required for medical reasons? No [0] : 2) Feeling down, depressed, or hopeless: Not at all (0) [PHQ-2 Negative - No further assessment needed] : PHQ-2 Negative - No further assessment needed [[id number]] : 0 [Never] : Never [Patient reported mammogram was normal] : Patient reported mammogram was normal [Patient reported PAP Smear was normal] : Patient reported PAP Smear was normal [Patient reported colonoscopy was normal] : Patient reported colonoscopy was normal [MammogramComments] : Due, Rx for mammo/sono [ColonoscopyComments] : 2019, 5 year repeat. ([facility])

## 2024-06-06 NOTE — HISTORY OF PRESENT ILLNESS
[FreeTextEntry1] : CPE [de-identified] : Darby is here today for CPE.  No acute concerns.  She recent returned from [facility].

## 2024-06-06 NOTE — REVIEW OF SYSTEMS
[Fever] : no fever [Chills] : no chills [Night Sweats] : no night sweats [Chest Pain] : no chest pain [Palpitations] : no palpitations [Shortness Of Breath] : no shortness of breath [Wheezing] : no wheezing [Cough] : no cough [Abdominal Pain] : no abdominal pain [Nausea] : no nausea [Constipation] : no constipation [Diarrhea] : diarrhea [Dysuria] : no dysuria [Hematuria] : no hematuria [Joint Pain] : no joint pain [Muscle Pain] : no muscle pain [Headache] : no headache [Dizziness] : no dizziness [Anxiety] : anxiety [Depression] : no depression

## 2024-06-06 NOTE — ASSESSMENT
[FreeTextEntry1] : CPE CBC, CMP, A1c, lipids, UA, stool fit EKG is SB UTD on PAP Rx for mammo/sono UTD on colo  1. Anxiety/depression  Continue lexapro 15mg qd prn alprazolam   F/U 6 months

## 2024-07-08 ENCOUNTER — NON-APPOINTMENT (OUTPATIENT)
Age: 44
End: 2024-07-08

## 2024-10-21 ENCOUNTER — RX RENEWAL (OUTPATIENT)
Age: 44
End: 2024-10-21

## 2025-01-14 ENCOUNTER — RX RENEWAL (OUTPATIENT)
Age: 45
End: 2025-01-14

## 2025-01-21 ENCOUNTER — APPOINTMENT (OUTPATIENT)
Dept: INTERNAL MEDICINE | Facility: CLINIC | Age: 45
End: 2025-01-21
Payer: COMMERCIAL

## 2025-01-21 VITALS
HEIGHT: 66 IN | HEART RATE: 59 BPM | OXYGEN SATURATION: 96 % | BODY MASS INDEX: 30.37 KG/M2 | RESPIRATION RATE: 12 BRPM | WEIGHT: 189 LBS | SYSTOLIC BLOOD PRESSURE: 111 MMHG | DIASTOLIC BLOOD PRESSURE: 70 MMHG

## 2025-01-21 DIAGNOSIS — E55.9 VITAMIN D DEFICIENCY, UNSPECIFIED: ICD-10-CM

## 2025-01-21 DIAGNOSIS — E78.5 HYPERLIPIDEMIA, UNSPECIFIED: ICD-10-CM

## 2025-01-21 PROCEDURE — G2211 COMPLEX E/M VISIT ADD ON: CPT

## 2025-01-21 PROCEDURE — 99214 OFFICE O/P EST MOD 30 MIN: CPT

## 2025-02-28 ENCOUNTER — NON-APPOINTMENT (OUTPATIENT)
Age: 45
End: 2025-02-28

## 2025-02-28 ENCOUNTER — APPOINTMENT (OUTPATIENT)
Dept: OBGYN | Facility: CLINIC | Age: 45
End: 2025-02-28
Payer: COMMERCIAL

## 2025-02-28 VITALS
WEIGHT: 192 LBS | SYSTOLIC BLOOD PRESSURE: 100 MMHG | BODY MASS INDEX: 30.86 KG/M2 | HEIGHT: 66 IN | DIASTOLIC BLOOD PRESSURE: 69 MMHG | HEART RATE: 60 BPM

## 2025-02-28 DIAGNOSIS — Z12.39 ENCOUNTER FOR OTHER SCREENING FOR MALIGNANT NEOPLASM OF BREAST: ICD-10-CM

## 2025-02-28 DIAGNOSIS — Z12.31 ENCOUNTER FOR SCREENING MAMMOGRAM FOR MALIGNANT NEOPLASM OF BREAST: ICD-10-CM

## 2025-02-28 DIAGNOSIS — Z01.419 ENCOUNTER FOR GYNECOLOGICAL EXAMINATION (GENERAL) (ROUTINE) W/OUT ABNORMAL FINDINGS: ICD-10-CM

## 2025-02-28 DIAGNOSIS — Z90.49 ACQUIRED ABSENCE OF OTHER SPECIFIED PARTS OF DIGESTIVE TRACT: ICD-10-CM

## 2025-02-28 DIAGNOSIS — Z98.890 OTHER SPECIFIED POSTPROCEDURAL STATES: ICD-10-CM

## 2025-02-28 DIAGNOSIS — R10.2 PELVIC AND PERINEAL PAIN: ICD-10-CM

## 2025-02-28 DIAGNOSIS — Z11.51 ENCOUNTER FOR SCREENING FOR HUMAN PAPILLOMAVIRUS (HPV): ICD-10-CM

## 2025-02-28 DIAGNOSIS — Z86.018 PERSONAL HISTORY OF OTHER BENIGN NEOPLASM: ICD-10-CM

## 2025-02-28 DIAGNOSIS — N92.6 IRREGULAR MENSTRUATION, UNSPECIFIED: ICD-10-CM

## 2025-02-28 DIAGNOSIS — R73.09 OTHER ABNORMAL GLUCOSE: ICD-10-CM

## 2025-02-28 DIAGNOSIS — R92.333 MAMMOGRAPHIC HETEROGENEOUS DENSITY, BILATERAL BREASTS: ICD-10-CM

## 2025-02-28 PROCEDURE — 99459 PELVIC EXAMINATION: CPT

## 2025-02-28 PROCEDURE — 99396 PREV VISIT EST AGE 40-64: CPT

## 2025-03-03 LAB — HPV HIGH+LOW RISK DNA PNL CVX: NOT DETECTED

## 2025-03-05 LAB — CYTOLOGY CVX/VAG DOC THIN PREP: NORMAL

## 2025-03-12 ENCOUNTER — TRANSCRIPTION ENCOUNTER (OUTPATIENT)
Age: 45
End: 2025-03-12

## 2025-08-22 ENCOUNTER — RX RENEWAL (OUTPATIENT)
Age: 45
End: 2025-08-22

## (undated) DEVICE — PREP CHLORAPREP HI-LITE ORANGE 26ML

## (undated) DEVICE — WARMING BLANKET UPPER ADULT

## (undated) DEVICE — GLV 8 PROTEXIS (WHITE)

## (undated) DEVICE — MARKING PEN W RULER / LABELS

## (undated) DEVICE — SYR LUER LOK 20CC

## (undated) DEVICE — STAPLER SKIN PROXIMATE

## (undated) DEVICE — GOWN ROYAL SILK XL

## (undated) DEVICE — GLV 7.5 PROTEXIS (WHITE)

## (undated) DEVICE — ELCTR GROUNDING PAD ADULT COVIDIEN

## (undated) DEVICE — SUT VICRYL 2-0 27" CT-1 UNDYED

## (undated) DEVICE — SOL IRR POUR NS 0.9% 1000ML

## (undated) DEVICE — VENODYNE/SCD SLEEVE CALF MEDIUM

## (undated) DEVICE — VENODYNE/SCD SLEEVE FOOT

## (undated) DEVICE — SUT VICRYL 1 27" CT

## (undated) DEVICE — SUT CHROMIC 1 27" CT

## (undated) DEVICE — NDL HYPO SAFE 20G X 1.5" (YELLOW)

## (undated) DEVICE — DRSG TELFA 3 X 4

## (undated) DEVICE — PREP BETADINE KIT

## (undated) DEVICE — SUT CHROMIC 0 27" CT

## (undated) DEVICE — PACK MAJOR ABDOMINAL WITH LAP

## (undated) DEVICE — FOLEY TRAY 16FR 5CC LF UMETER CLOSED

## (undated) DEVICE — SUT CHROMIC 2-0 27" CT

## (undated) DEVICE — SOL IRR POUR H2O 1000ML